# Patient Record
Sex: FEMALE | Race: WHITE | NOT HISPANIC OR LATINO | Employment: FULL TIME | URBAN - NONMETROPOLITAN AREA
[De-identification: names, ages, dates, MRNs, and addresses within clinical notes are randomized per-mention and may not be internally consistent; named-entity substitution may affect disease eponyms.]

---

## 2017-01-09 RX ORDER — FLUCONAZOLE 150 MG/1
150 TABLET ORAL ONCE
Qty: 2 TABLET | Refills: 2 | Status: SHIPPED | OUTPATIENT
Start: 2017-01-09 | End: 2017-01-09

## 2017-01-30 DIAGNOSIS — IMO0002 ADULT BODY MASS INDEX GREATER THAN 30: ICD-10-CM

## 2017-01-30 DIAGNOSIS — Z78.9 CURRENTLY ATTEMPTING TO QUIT USING TOBACCO: ICD-10-CM

## 2017-01-30 RX ORDER — TOPIRAMATE 100 MG/1
1 CAPSULE, EXTENDED RELEASE ORAL
Qty: 30 CAPSULE | Refills: 3 | Status: SHIPPED | OUTPATIENT
Start: 2017-01-30 | End: 2017-03-06 | Stop reason: SDUPTHER

## 2017-01-30 RX ORDER — BUPROPION HYDROCHLORIDE 300 MG/1
300 TABLET ORAL EVERY MORNING
Qty: 30 TABLET | Refills: 3 | Status: SHIPPED | OUTPATIENT
Start: 2017-01-30 | End: 2017-06-05 | Stop reason: SDUPTHER

## 2017-01-30 RX ORDER — PHENTERMINE HYDROCHLORIDE 15 MG/1
15 CAPSULE ORAL EVERY MORNING
Qty: 30 CAPSULE | Refills: 0 | Status: SHIPPED | OUTPATIENT
Start: 2017-01-30 | End: 2017-03-06 | Stop reason: SDUPTHER

## 2017-02-23 ENCOUNTER — OFFICE VISIT (OUTPATIENT)
Dept: INTERNAL MEDICINE | Facility: CLINIC | Age: 37
End: 2017-02-23

## 2017-02-23 ENCOUNTER — TELEPHONE (OUTPATIENT)
Dept: INTERNAL MEDICINE | Facility: CLINIC | Age: 37
End: 2017-02-23

## 2017-02-23 VITALS
SYSTOLIC BLOOD PRESSURE: 126 MMHG | BODY MASS INDEX: 35.16 KG/M2 | HEIGHT: 67 IN | HEART RATE: 82 BPM | OXYGEN SATURATION: 99 % | DIASTOLIC BLOOD PRESSURE: 84 MMHG | TEMPERATURE: 99.2 F | WEIGHT: 224 LBS

## 2017-02-23 DIAGNOSIS — J10.1 INFLUENZA A: Primary | ICD-10-CM

## 2017-02-23 LAB
EXPIRATION DATE: ABNORMAL
FLUAV AG NPH QL: POSITIVE
FLUBV AG NPH QL: NEGATIVE
INTERNAL CONTROL: ABNORMAL
Lab: ABNORMAL

## 2017-02-23 PROCEDURE — 87804 INFLUENZA ASSAY W/OPTIC: CPT | Performed by: PHYSICIAN ASSISTANT

## 2017-02-23 PROCEDURE — 99213 OFFICE O/P EST LOW 20 MIN: CPT | Performed by: PHYSICIAN ASSISTANT

## 2017-02-23 RX ORDER — DEXTROMETHORPHAN HYDROBROMIDE AND PROMETHAZINE HYDROCHLORIDE 15; 6.25 MG/5ML; MG/5ML
SYRUP ORAL
Qty: 180 ML | Refills: 0 | Status: SHIPPED | OUTPATIENT
Start: 2017-02-23 | End: 2017-03-31

## 2017-02-23 RX ORDER — OSELTAMIVIR PHOSPHATE 75 MG/1
75 CAPSULE ORAL 2 TIMES DAILY
Qty: 10 CAPSULE | Refills: 0 | Status: SHIPPED | OUTPATIENT
Start: 2017-02-23 | End: 2017-02-28

## 2017-02-23 NOTE — TELEPHONE ENCOUNTER
----- Message from Cierra Bowens sent at 2/23/2017  4:10 PM EST -----  Contact: Patient   Patient called requesting to see if there is any way that she could be squeezed in this evening. She said that she has spiked a fever of 102.0 and she is wanting to avoid the urgent care.

## 2017-02-23 NOTE — PROGRESS NOTES
America Bonilla is a 36 y.o. female.     Subjective   History of Present Illness   Here today with concern of sudden onset fever this afternoon. Had a headache prior to fever onset. No cough or chills.       The following portions of the patient's history were reviewed and updated as appropriate: allergies, current medications, past family history, past medical history, past social history, past surgical history and problem list.    Review of Systems    Constitutional: fever. Negative for appetite change, chills, fatigue and unexpected weight change.   HENT: Negative for congestion, ear pain, hearing loss, nosebleeds, postnasal drip, rhinorrhea, sore throat, tinnitus and trouble swallowing.    Eyes: Negative for photophobia, discharge and visual disturbance.   Respiratory: Negative for cough, chest tightness, shortness of breath and wheezing.    Cardiovascular: Negative for chest pain, palpitations and leg swelling.   Gastrointestinal: Negative for abdominal distention, abdominal pain, blood in stool, constipation, diarrhea, nausea and vomiting.   Endocrine: Negative for cold intolerance, heat intolerance, polydipsia, polyphagia and polyuria.   Musculoskeletal: body aches. Negative for back pain, joint swelling, myalgias, neck pain and neck stiffness.   Skin: Negative for color change, pallor, rash and wound.   Allergic/Immunologic: Negative for environmental allergies, food allergies and immunocompromised state.   Neurological: Negative for dizziness, tremors, seizures, weakness, numbness and headaches.   Hematological: Negative for adenopathy. Does not bruise/bleed easily.   Psychiatric/Behavioral: Negative for agitation, behavioral problems, confusion, hallucinations, self-injury and suicidal ideas. The patient is not nervous/anxious.      Objective    Physical Exam  Constitutional: Oriented to person, place, and time. Appears well-developed and well-nourished.   HENT:   Head: Normocephalic and atraumatic.  "  Eyes: EOM are normal. Pupils are equal, round, and reactive to light.   Neck: Normal range of motion. Neck supple.   Cardiovascular: Normal rate, regular rhythm and normal heart sounds.    Pulmonary/Chest: Effort normal and breath sounds normal. No respiratory distress.  Has no wheezes or rales. Exhibits no chest wall tenderness.   Abdominal: Soft. Bowel sounds are normal. Exhibits no distension and no mass. There is no tenderness.   Musculoskeletal: Normal range of motion. Exhibits no tenderness.   Neurological: Alert and oriented to person, place, and time.   Skin: Skin is warm and dry.   Psychiatric: Has a normal mood and affect. Behavior is normal. Judgment and thought content normal.       Visit Vitals   • /84   • Pulse 82   • Temp 99.2 °F (37.3 °C)   • Ht 67\" (170.2 cm)   • Wt 224 lb (102 kg)   • SpO2 99%   • BMI 35.08 kg/m2       Nursing note and vitals reviewed.        Assessment/Plan   America was seen today for fever.    Diagnoses and all orders for this visit:    Influenza A  -     oseltamivir (TAMIFLU) 75 MG capsule; Take 1 capsule by mouth 2 (Two) Times a Day for 5 days.  -     promethazine-dextromethorphan (PROMETHAZINE-DM) 6.25-15 MG/5ML syrup; Take 5-10 mL by mouth at bedtime as needed for cough.               "

## 2017-03-06 DIAGNOSIS — IMO0002 ADULT BODY MASS INDEX GREATER THAN 30: ICD-10-CM

## 2017-03-06 NOTE — TELEPHONE ENCOUNTER
I have already sent Phentermine rx to Dr. Cisneros to refill. I cannot send the Trokendi b/c she has one rx that is pending. It has been sent to Dr. Cisneros as well.

## 2017-03-06 NOTE — TELEPHONE ENCOUNTER
----- Message from Perri Cabello sent at 3/6/2017 12:06 PM EST -----  Contact: PATIENT  Patient called stating that she needed to refill her meds, sent the request for one, but the other was not listed.   States that she also needs TROKENDI XR 50 MG capsule sustained-release 24 hr.    Thank you.

## 2017-03-07 RX ORDER — PHENTERMINE HYDROCHLORIDE 15 MG/1
15 CAPSULE ORAL EVERY MORNING
Qty: 30 CAPSULE | Refills: 0 | Status: SHIPPED | OUTPATIENT
Start: 2017-03-07 | End: 2017-03-31

## 2017-03-07 RX ORDER — TOPIRAMATE 100 MG/1
1 CAPSULE, EXTENDED RELEASE ORAL
Qty: 30 CAPSULE | Refills: 3 | Status: SHIPPED | OUTPATIENT
Start: 2017-03-07 | End: 2017-03-31

## 2017-03-31 ENCOUNTER — OFFICE VISIT (OUTPATIENT)
Dept: INTERNAL MEDICINE | Facility: CLINIC | Age: 37
End: 2017-03-31

## 2017-03-31 VITALS
SYSTOLIC BLOOD PRESSURE: 124 MMHG | TEMPERATURE: 98.4 F | RESPIRATION RATE: 12 BRPM | HEIGHT: 67 IN | OXYGEN SATURATION: 97 % | HEART RATE: 68 BPM | WEIGHT: 223 LBS | BODY MASS INDEX: 35 KG/M2 | DIASTOLIC BLOOD PRESSURE: 82 MMHG

## 2017-03-31 DIAGNOSIS — F50.81 BINGE-EATING DISORDER, MODERATE: ICD-10-CM

## 2017-03-31 DIAGNOSIS — F41.1 GENERALIZED ANXIETY DISORDER: Primary | ICD-10-CM

## 2017-03-31 PROCEDURE — 99214 OFFICE O/P EST MOD 30 MIN: CPT | Performed by: FAMILY MEDICINE

## 2017-03-31 RX ORDER — HYDROXYZINE HYDROCHLORIDE 25 MG/1
25 TABLET, FILM COATED ORAL 3 TIMES DAILY PRN
Qty: 90 TABLET | Refills: 1 | Status: SHIPPED | OUTPATIENT
Start: 2017-03-31 | End: 2018-03-09

## 2017-04-04 DIAGNOSIS — F50.81 BINGE-EATING DISORDER, MODERATE: ICD-10-CM

## 2017-05-04 DIAGNOSIS — F50.81 BINGE-EATING DISORDER, MODERATE: ICD-10-CM

## 2017-05-04 RX ORDER — AMOXICILLIN 875 MG/1
875 TABLET, COATED ORAL 2 TIMES DAILY
Qty: 20 TABLET | Refills: 0 | Status: SHIPPED | OUTPATIENT
Start: 2017-05-04 | End: 2017-12-05

## 2017-06-01 RX ORDER — FLUCONAZOLE 150 MG/1
150 TABLET ORAL ONCE
Qty: 1 TABLET | Refills: 1 | Status: SHIPPED | OUTPATIENT
Start: 2017-06-01 | End: 2017-06-01

## 2017-06-05 ENCOUNTER — PATIENT MESSAGE (OUTPATIENT)
Dept: INTERNAL MEDICINE | Facility: CLINIC | Age: 37
End: 2017-06-05

## 2017-06-05 DIAGNOSIS — Z78.9 CURRENTLY ATTEMPTING TO QUIT USING TOBACCO: ICD-10-CM

## 2017-06-05 DIAGNOSIS — F50.81 BINGE-EATING DISORDER, MODERATE: ICD-10-CM

## 2017-06-05 RX ORDER — BUPROPION HYDROCHLORIDE 300 MG/1
300 TABLET ORAL EVERY MORNING
Qty: 30 TABLET | Refills: 3 | Status: SHIPPED | OUTPATIENT
Start: 2017-06-05 | End: 2017-10-09 | Stop reason: SDUPTHER

## 2017-06-05 NOTE — TELEPHONE ENCOUNTER
From: America Bonilla  To: Patty Cisneros MD  Sent: 6/5/2017 5:14 AM EDT  Subject: Prescription Question    I need refills of vyvanse and Wellbutrin please

## 2017-06-28 RX ORDER — FLUCONAZOLE 150 MG/1
150 TABLET ORAL ONCE
Qty: 1 TABLET | Refills: 1 | Status: SHIPPED | OUTPATIENT
Start: 2017-06-28 | End: 2017-06-28

## 2017-07-07 DIAGNOSIS — F50.81 BINGE-EATING DISORDER, MODERATE: ICD-10-CM

## 2017-07-21 ENCOUNTER — OFFICE VISIT (OUTPATIENT)
Dept: INTERNAL MEDICINE | Facility: CLINIC | Age: 37
End: 2017-07-21

## 2017-07-21 VITALS
TEMPERATURE: 98.4 F | OXYGEN SATURATION: 97 % | HEART RATE: 70 BPM | WEIGHT: 228.8 LBS | HEIGHT: 67 IN | DIASTOLIC BLOOD PRESSURE: 60 MMHG | BODY MASS INDEX: 35.91 KG/M2 | SYSTOLIC BLOOD PRESSURE: 104 MMHG

## 2017-07-21 DIAGNOSIS — F50.81 BINGE-EATING DISORDER, MODERATE: Primary | ICD-10-CM

## 2017-07-21 DIAGNOSIS — F41.1 GENERALIZED ANXIETY DISORDER: ICD-10-CM

## 2017-07-21 PROCEDURE — 99213 OFFICE O/P EST LOW 20 MIN: CPT | Performed by: FAMILY MEDICINE

## 2017-07-21 NOTE — PATIENT INSTRUCTIONS
macronutrients   trying to get 40-50 calories from proteins,   30-40% calories from unsaturated fats,   25-30% calories from carbohydrates      intermittent fasting (limiting caloric intake to 400-500 hermelindo a day twice a week).    2 different protocols:     30 minutes:    Run as fast as you can for 10 seconds   Walk as fast as you can for 20 seconds   Walk as slow as you can for 30 seconds    30 minutes on stationary bike (muscle energy booster)   Ride as hard and as fast as you can for 1 minute   Rest for 4 minutes

## 2017-07-25 NOTE — PROGRESS NOTES
"SUBJECTIVE: America Bonilla is a 37 y.o. female seen for a follow up visit;    BED: doing better from binging standpoint. But not making good food choices when she does eat.  Also not exercising at all.  She isn't having any side effects from the vyvanse however.       ANJUM: Doing ok, not having any panic attacks.  Just moved and really likes the new place.  She was able to handle the move w/o any big mood swings or issues.   The following portions of the patient's history were reviewed and updated as appropriate: current medications and problem list.    Review of Systems   Constitutional: Negative.    HENT: Negative.    Eyes: Negative.    Respiratory: Negative.    Cardiovascular: Negative.    Gastrointestinal: Negative.    Endocrine: Negative.    Genitourinary: Negative.    Musculoskeletal: Negative.    Skin: Negative.    Allergic/Immunologic: Negative.    Neurological: Negative.    Hematological: Negative.    Psychiatric/Behavioral: Negative.  Negative for dysphoric mood and sleep disturbance. The patient is not nervous/anxious.          OBJECTIVE:  /60  Pulse 70  Temp 98.4 °F (36.9 °C)  Ht 67\" (170.2 cm)  Wt 228 lb 12.8 oz (104 kg)  SpO2 97%  BMI 35.84 kg/m2     Physical Exam   Constitutional: She appears well-developed and well-nourished. No distress.   Psychiatric: She has a normal mood and affect. Her speech is normal and behavior is normal. Thought content normal.           ASSESSMENT:  1. Binge-eating disorder, moderate  stable  - lisdexamfetamine (VYVANSE) 50 MG capsule; Take 1 capsule by mouth Every Morning.  Dispense: 30 capsule; Refill: 0    2. Generalized anxiety disorder  Stable, continue wellbutrin.     America Bonilla counseled about obesity and need for weight loss.  Discussed the principles of nutrition labels (yes), decreased sugary drinks like soda, sweet tea, gatorade etc. (yes), decreasing simple carbohydrates and trading for more fiber containing carbs (yes).  Discussed trading " high fat meats for lean meats like pork, poultry, fish (yes).  Recommended adding non-saturated fats and proteins to earlier day meals to help manage hunger (yes).  Also discussed need for increased physical activity (yes).      Discussed macronutrients and recommended trying to get 40-50 calories from proteins, 30-40% calories from unsaturated fats, and 25-30% calories from carbohydrates (yes).      Total time spent counseling > 50% visit time of total visit time 20 minutes. Time spent counseling: 15 minutes.

## 2017-09-05 DIAGNOSIS — F50.81 BINGE-EATING DISORDER, MODERATE: ICD-10-CM

## 2017-09-05 RX ORDER — FLUCONAZOLE 150 MG/1
150 TABLET ORAL ONCE
Qty: 1 TABLET | Refills: 0 | Status: SHIPPED | OUTPATIENT
Start: 2017-09-05 | End: 2017-09-05

## 2017-10-09 DIAGNOSIS — F50.81 BINGE-EATING DISORDER, MODERATE: ICD-10-CM

## 2017-10-09 DIAGNOSIS — Z78.9 CURRENTLY ATTEMPTING TO QUIT USING TOBACCO: ICD-10-CM

## 2017-10-09 RX ORDER — BUPROPION HYDROCHLORIDE 300 MG/1
300 TABLET ORAL EVERY MORNING
Qty: 30 TABLET | Refills: 3 | Status: SHIPPED | OUTPATIENT
Start: 2017-10-09 | End: 2017-12-05 | Stop reason: SDUPTHER

## 2017-12-05 ENCOUNTER — OFFICE VISIT (OUTPATIENT)
Dept: INTERNAL MEDICINE | Facility: CLINIC | Age: 37
End: 2017-12-05

## 2017-12-05 VITALS
SYSTOLIC BLOOD PRESSURE: 110 MMHG | HEIGHT: 67 IN | WEIGHT: 231.6 LBS | OXYGEN SATURATION: 98 % | RESPIRATION RATE: 12 BRPM | BODY MASS INDEX: 36.35 KG/M2 | DIASTOLIC BLOOD PRESSURE: 70 MMHG | HEART RATE: 66 BPM

## 2017-12-05 DIAGNOSIS — E53.8 VITAMIN B12 DEFICIENCY: ICD-10-CM

## 2017-12-05 DIAGNOSIS — Z00.00 HEALTHCARE MAINTENANCE: ICD-10-CM

## 2017-12-05 DIAGNOSIS — E55.9 VITAMIN D DEFICIENCY: ICD-10-CM

## 2017-12-05 DIAGNOSIS — F41.1 GENERALIZED ANXIETY DISORDER: Primary | ICD-10-CM

## 2017-12-05 PROCEDURE — 99213 OFFICE O/P EST LOW 20 MIN: CPT | Performed by: FAMILY MEDICINE

## 2017-12-05 RX ORDER — BUPROPION HYDROCHLORIDE 300 MG/1
300 TABLET ORAL EVERY MORNING
Qty: 90 TABLET | Refills: 1 | Status: SHIPPED | OUTPATIENT
Start: 2017-12-05 | End: 2018-06-04 | Stop reason: SDUPTHER

## 2017-12-05 NOTE — PROGRESS NOTES
"3 month follow up visit. Pt d/c'd Vyvanse. Wasn't really helping, so she did not ask for refill when she ran out.   Had pap last month with GYN. Was WNL. Is scheduled for first mammogram in January.     SUBJECTIVE: America Bonilla is a 37 y.o. female seen for a follow up visit;          BED: doing well, not binging.  Stopped the vyvanse and hasn't noticed a difference.  No weight gain since stopping.     Obesity: doing better as far as diet - trying hello fresh a few times a week + lunch.  She is trying to keep healthier things in the house and not eating out as much.      The following portions of the patient's history were reviewed and updated as appropriate: She  has a past medical history of Anemia; Annular tear of lumbar disc; Lumbar herniated disc; Ovarian cyst; Seizures; and Vasovagal syncope.  She has Adult body mass index greater than 30 and Tobacco abuse on her pertinent problem list.  She  has a past surgical history that includes Breast surgery and Colposcopy.  Her family history includes Cancer in her other and other; Heart disease in her father; Stroke in her other; Thyroid disease in her mother.  She  reports that she has been smoking Cigarettes.  She has a 8.50 pack-year smoking history. She has never used smokeless tobacco. She reports that she drinks alcohol. She reports that she does not use illicit drugs.  She has a current medication list which includes the following prescription(s): bupropion xl, hydroxyzine, and levonorgestrel..    Review of Systems   Constitutional: Positive for fatigue. Negative for unexpected weight change.   Respiratory: Negative.    Cardiovascular: Negative.    Gastrointestinal: Negative.    Neurological: Negative.          OBJECTIVE:  /70  Pulse 66  Resp 12  Ht 170.2 cm (67\")  Wt 105 kg (231 lb 9.6 oz)  SpO2 98%  BMI 36.27 kg/m2     Physical Exam   Constitutional: She appears well-developed and well-nourished. No distress.           ASSESSMENT:   Diagnosis " Plan   1. Generalized anxiety disorder  buPROPion XL (WELLBUTRIN XL) 300 MG 24 hr tablet    TSH    T4   2. Vitamin B12 deficiency  CBC (No Diff)    Ferritin    Iron Profile    Vitamin B12    Glia(IgA / G) & TTG(IgA / G)   3. Vitamin D deficiency  Vitamin D 25 Hydroxy   4. Healthcare maintenance  Comprehensive Metabolic Panel    Vitamin D 25 Hydroxy    TSH    T4    CBC (No Diff)    Ferritin    Iron Profile    Vitamin B12       Medications Discontinued During This Encounter   Medication Reason   • lisdexamfetamine (VYVANSE) 50 MG capsule    • buPROPion XL (WELLBUTRIN XL) 300 MG 24 hr tablet Reorder   • amoxicillin (AMOXIL) 875 MG tablet            Return in about 3 months (around 3/5/2018).

## 2017-12-06 LAB
25(OH)D3+25(OH)D2 SERPL-MCNC: 37.8 NG/ML
ALBUMIN SERPL-MCNC: 4.4 G/DL (ref 3.5–5)
ALBUMIN/GLOB SERPL: 1.6 G/DL (ref 1–2)
ALP SERPL-CCNC: 62 U/L (ref 38–126)
ALT SERPL-CCNC: 30 U/L (ref 13–69)
AST SERPL-CCNC: 26 U/L (ref 15–46)
BILIRUB SERPL-MCNC: 0.4 MG/DL (ref 0.2–1.3)
BUN SERPL-MCNC: 15 MG/DL (ref 7–20)
BUN/CREAT SERPL: 21.4 (ref 7.1–23.5)
CALCIUM SERPL-MCNC: 9.7 MG/DL (ref 8.4–10.2)
CHLORIDE SERPL-SCNC: 103 MMOL/L (ref 98–107)
CO2 SERPL-SCNC: 25 MMOL/L (ref 26–30)
CREAT SERPL-MCNC: 0.7 MG/DL (ref 0.6–1.3)
ERYTHROCYTE [DISTWIDTH] IN BLOOD BY AUTOMATED COUNT: 11.8 % (ref 11.5–14.5)
FERRITIN SERPL-MCNC: 37.9 NG/ML (ref 6.24–137)
GFR SERPLBLD CREATININE-BSD FMLA CKD-EPI: 115 ML/MIN/1.73
GFR SERPLBLD CREATININE-BSD FMLA CKD-EPI: 95 ML/MIN/1.73
GLIADIN PEPTIDE IGA SER-ACNC: 2 UNITS (ref 0–19)
GLIADIN PEPTIDE IGG SER-ACNC: 4 UNITS (ref 0–19)
GLOBULIN SER CALC-MCNC: 2.8 GM/DL
GLUCOSE SERPL-MCNC: 86 MG/DL (ref 74–98)
HCT VFR BLD AUTO: 41.5 % (ref 37–47)
HGB BLD-MCNC: 13.9 G/DL (ref 12–16)
IRON SATN MFR SERPL: 35 % (ref 11–46)
IRON SERPL-MCNC: 104 MCG/DL (ref 37–181)
MCH RBC QN AUTO: 31.1 PG (ref 27–31)
MCHC RBC AUTO-ENTMCNC: 33.5 G/DL (ref 30–37)
MCV RBC AUTO: 92.8 FL (ref 81–99)
PLATELET # BLD AUTO: 189 10*3/MM3 (ref 130–400)
POTASSIUM SERPL-SCNC: 3.8 MMOL/L (ref 3.5–5.1)
PROT SERPL-MCNC: 7.2 G/DL (ref 6.3–8.2)
RBC # BLD AUTO: 4.47 10*6/MM3 (ref 4.2–5.4)
SODIUM SERPL-SCNC: 140 MMOL/L (ref 137–145)
T4 SERPL-MCNC: 9 UG/DL (ref 4.5–12)
TIBC SERPL-MCNC: 298 MCG/DL (ref 261–497)
TSH SERPL DL<=0.005 MIU/L-ACNC: 1.88 MIU/ML (ref 0.47–4.68)
TTG IGA SER-ACNC: <2 U/ML (ref 0–3)
TTG IGG SER-ACNC: <2 U/ML (ref 0–5)
UIBC SERPL-MCNC: 194 MCG/DL
VIT B12 SERPL-MCNC: 788 PG/ML (ref 239–931)
WBC # BLD AUTO: 8.84 10*3/MM3 (ref 4.8–10.8)

## 2017-12-26 ENCOUNTER — DOCUMENTATION (OUTPATIENT)
Dept: INTERNAL MEDICINE | Facility: CLINIC | Age: 37
End: 2017-12-26

## 2017-12-26 RX ORDER — OSELTAMIVIR PHOSPHATE 75 MG/1
75 CAPSULE ORAL 2 TIMES DAILY
Qty: 10 CAPSULE | Refills: 0 | Status: SHIPPED | OUTPATIENT
Start: 2017-12-26 | End: 2017-12-31

## 2018-01-31 ENCOUNTER — OFFICE VISIT (OUTPATIENT)
Dept: INTERNAL MEDICINE | Facility: CLINIC | Age: 38
End: 2018-01-31

## 2018-01-31 VITALS
OXYGEN SATURATION: 95 % | WEIGHT: 239 LBS | BODY MASS INDEX: 37.51 KG/M2 | TEMPERATURE: 98.6 F | HEART RATE: 91 BPM | DIASTOLIC BLOOD PRESSURE: 88 MMHG | HEIGHT: 67 IN | SYSTOLIC BLOOD PRESSURE: 128 MMHG

## 2018-01-31 DIAGNOSIS — S69.91XA FINGER INJURY, RIGHT, INITIAL ENCOUNTER: Primary | ICD-10-CM

## 2018-01-31 PROCEDURE — 90715 TDAP VACCINE 7 YRS/> IM: CPT | Performed by: PHYSICIAN ASSISTANT

## 2018-01-31 PROCEDURE — 99213 OFFICE O/P EST LOW 20 MIN: CPT | Performed by: PHYSICIAN ASSISTANT

## 2018-01-31 PROCEDURE — 90471 IMMUNIZATION ADMIN: CPT | Performed by: PHYSICIAN ASSISTANT

## 2018-01-31 NOTE — PROGRESS NOTES
"Subjective     Chief Complaint: finger injury    History of Present Illness     America Bonilla is a 37 y.o. female presenting with complaints of injury to right middle finger 4 days ago.  States her window is broken at home, typically she uses a wooden spoon or something to keep it open.  The window came down and caught a piece of tissue on her right middle finger.  She and her  applied pressure until bleeding stopped, since then they have been irrigating the wound with water and keeping it bandaged with Neosporin.  Her pain and the wound are improving, no signs of infection.  Denies fever, chills.  Unsure of her last Tdap, believes it has been more than 10 years.    The following portions of the patient's history were reviewed and updated as appropriate: current medications, allergies, PMH.    Review of Systems   Constitutional: Negative for chills and fever.   Skin: Positive for wound.       Objective     Vitals:    01/31/18 1346   BP: 128/88   Pulse: 91   Temp: 98.6 °F (37 °C)   SpO2: 95%   Weight: 108 kg (239 lb)   Height: 170.2 cm (67.01\")       Physical Exam   Skin: Abrasion noted. No erythema.   Round wound to dorsal side of right middle finger directly beneath the fingernail.  No signs of erythema or warmth.  Finger is not swollen.       Assessment/Plan     Diagnoses and all orders for this visit:    Injury  -     Tdap Vaccine Greater Than or Equal To 8yo IM      Update Tdap Today.  Continue to keep wound clean, Neosporin or vaseline is fine, watch for any signs of infection- patient verbalized understanding.    RTC if symptoms worsen or fail to improve.    Yomaira Cm PA-C  01/31/2018         Please note that portions of this note were completed with a voice recognition program. Efforts were made to edit dictation, but occasionally words are mistranscribed.  "

## 2018-02-26 ENCOUNTER — OFFICE VISIT (OUTPATIENT)
Dept: INTERNAL MEDICINE | Facility: CLINIC | Age: 38
End: 2018-02-26

## 2018-02-26 VITALS
HEIGHT: 67 IN | BODY MASS INDEX: 38.8 KG/M2 | SYSTOLIC BLOOD PRESSURE: 110 MMHG | HEART RATE: 71 BPM | TEMPERATURE: 97.8 F | WEIGHT: 247.2 LBS | DIASTOLIC BLOOD PRESSURE: 70 MMHG | RESPIRATION RATE: 12 BRPM | OXYGEN SATURATION: 97 %

## 2018-02-26 DIAGNOSIS — E66.09 CLASS 2 OBESITY DUE TO EXCESS CALORIES WITHOUT SERIOUS COMORBIDITY WITH BODY MASS INDEX (BMI) OF 38.0 TO 38.9 IN ADULT: Primary | ICD-10-CM

## 2018-02-26 PROCEDURE — 99213 OFFICE O/P EST LOW 20 MIN: CPT | Performed by: FAMILY MEDICINE

## 2018-02-26 RX ORDER — PHENTERMINE HYDROCHLORIDE 15 MG/1
15 CAPSULE ORAL EVERY MORNING
Qty: 14 CAPSULE | Refills: 0 | Status: SHIPPED | OUTPATIENT
Start: 2018-02-26 | End: 2018-03-09 | Stop reason: SDUPTHER

## 2018-03-09 ENCOUNTER — OFFICE VISIT (OUTPATIENT)
Dept: INTERNAL MEDICINE | Facility: CLINIC | Age: 38
End: 2018-03-09

## 2018-03-09 VITALS
BODY MASS INDEX: 38.04 KG/M2 | OXYGEN SATURATION: 98 % | WEIGHT: 242.4 LBS | HEART RATE: 74 BPM | HEIGHT: 67 IN | RESPIRATION RATE: 12 BRPM | DIASTOLIC BLOOD PRESSURE: 80 MMHG | SYSTOLIC BLOOD PRESSURE: 122 MMHG

## 2018-03-09 DIAGNOSIS — Z00.00 HEALTHCARE MAINTENANCE: ICD-10-CM

## 2018-03-09 DIAGNOSIS — F41.1 GENERALIZED ANXIETY DISORDER: Primary | ICD-10-CM

## 2018-03-09 PROCEDURE — 99213 OFFICE O/P EST LOW 20 MIN: CPT | Performed by: FAMILY MEDICINE

## 2018-03-09 RX ORDER — PHENTERMINE HYDROCHLORIDE 15 MG/1
15 CAPSULE ORAL 2 TIMES DAILY PRN
Qty: 60 CAPSULE | Refills: 0 | Status: SHIPPED | OUTPATIENT
Start: 2018-03-09 | End: 2018-04-05 | Stop reason: SDUPTHER

## 2018-03-09 NOTE — PROGRESS NOTES
3 month follow up, has started Phentermine that was rx'd at last visit.   [facility] mamm [facility]    SUBJECTIVE: America Bonilla is a 37 y.o. female seen for a follow up visit;          ANJUM: doing ok w/ wellbutrin 300 mg.  Has had several life stresses but has gotten better about coping and is able to manage her response to stresses.  She denies any side effects from the wellbutrin and doesn't think she needs to go up on it.  She hasn't tried to quit smoking yet and doesn't feel like she is ready for it.     Obesity: tolerating 15 m phentermine and it's helping with appetite.  Taking it twice a day some days if she needs it.  No side effects of increased blood pressure or sleep issues.     The following portions of the patient's history were reviewed and updated as appropriate: She  has a past medical history of Anemia; Annular tear of lumbar disc; Lumbar herniated disc; Ovarian cyst; Seizures; and Vasovagal syncope.  She has Adult body mass index greater than 30; Tobacco abuse; and Generalized anxiety disorder on her pertinent problem list.  She  has a past surgical history that includes Breast surgery and Colposcopy.  Her family history includes Alcohol abuse in her paternal grandmother; Breast cancer in her maternal grandmother; Cancer in her maternal grandmother; Early death in her paternal grandfather; Heart disease in her father; Hyperlipidemia in her father; Hypertension in her father; Liver disease in her paternal grandmother; No Known Problems in her brother and maternal grandfather; Thyroid disease in her mother.  She  reports that she has been smoking Cigarettes.  She started smoking about 22 years ago. She has a 8.50 pack-year smoking history. She has never used smokeless tobacco. She reports that she drinks alcohol. She reports that she does not use drugs.  She has a current medication list which includes the following prescription(s): bupropion xl, levonorgestrel, amoxicillin, and  "phentermine..    Review of Systems   Constitutional: Negative.    HENT: Negative.    Respiratory: Negative.    Cardiovascular: Negative.    Psychiatric/Behavioral: The patient is nervous/anxious.          OBJECTIVE:  /80   Pulse 74   Resp 12   Ht 170.2 cm (67.01\")   Wt 110 kg (242 lb 6.4 oz)   SpO2 98%   BMI 37.95 kg/m²      Physical Exam   Constitutional: She appears well-developed and well-nourished. No distress.           ASSESSMENT:   Diagnosis Plan   1. Generalized anxiety disorder     2. Healthcare maintenance       ANJUM: continue wellbutrin.       Medications Discontinued During This Encounter   Medication Reason   • phentermine 15 MG capsule Reorder   • hydrOXYzine (ATARAX) 25 MG tablet            Return in about 3 months (around 6/9/2018).                "

## 2018-03-21 RX ORDER — FLUCONAZOLE 150 MG/1
150 TABLET ORAL
Qty: 2 TABLET | Refills: 0 | Status: SHIPPED | OUTPATIENT
Start: 2018-03-21 | End: 2018-03-24

## 2018-03-21 RX ORDER — AMOXICILLIN 875 MG/1
875 TABLET, COATED ORAL 2 TIMES DAILY
Qty: 20 TABLET | Refills: 0 | Status: SHIPPED | OUTPATIENT
Start: 2018-03-21 | End: 2018-06-12

## 2018-04-05 DIAGNOSIS — E66.09 CLASS 2 OBESITY DUE TO EXCESS CALORIES WITHOUT SERIOUS COMORBIDITY WITH BODY MASS INDEX (BMI) OF 38.0 TO 38.9 IN ADULT: ICD-10-CM

## 2018-04-05 RX ORDER — PHENTERMINE HYDROCHLORIDE 15 MG/1
15 CAPSULE ORAL 2 TIMES DAILY PRN
Qty: 60 CAPSULE | Refills: 0 | Status: SHIPPED | OUTPATIENT
Start: 2018-04-05 | End: 2018-05-04 | Stop reason: SDUPTHER

## 2018-04-05 NOTE — TELEPHONE ENCOUNTER
----- Message from America Bonilla sent at 4/5/2018 10:26 AM EDT -----  Regarding: Prescription Question  Contact: [phone]  I need a refill on my Phentermene please.

## 2018-05-04 ENCOUNTER — PATIENT MESSAGE (OUTPATIENT)
Dept: INTERNAL MEDICINE | Facility: CLINIC | Age: 38
End: 2018-05-04

## 2018-05-04 DIAGNOSIS — E66.09 CLASS 2 OBESITY DUE TO EXCESS CALORIES WITHOUT SERIOUS COMORBIDITY WITH BODY MASS INDEX (BMI) OF 38.0 TO 38.9 IN ADULT: ICD-10-CM

## 2018-05-04 RX ORDER — PHENTERMINE HYDROCHLORIDE 15 MG/1
15 CAPSULE ORAL 2 TIMES DAILY PRN
Qty: 60 CAPSULE | Refills: 0 | Status: SHIPPED | OUTPATIENT
Start: 2018-05-04 | End: 2018-06-04 | Stop reason: SDUPTHER

## 2018-05-04 NOTE — TELEPHONE ENCOUNTER
----- Message from America Bonilla sent at 5/4/2018 10:54 AM EDT -----  Regarding: Prescription Question  Contact: [phone]  Can I get a refill on the phentermine?

## 2018-05-29 RX ORDER — FLUCONAZOLE 150 MG/1
150 TABLET ORAL
Qty: 3 TABLET | Refills: 1 | Status: SHIPPED | OUTPATIENT
Start: 2018-05-29 | End: 2018-06-12

## 2018-05-29 RX ORDER — FLUCONAZOLE 150 MG/1
150 TABLET ORAL
Qty: 3 TABLET | Refills: 1 | Status: SHIPPED | OUTPATIENT
Start: 2018-05-29 | End: 2018-05-29 | Stop reason: SDUPTHER

## 2018-06-04 ENCOUNTER — PATIENT MESSAGE (OUTPATIENT)
Dept: INTERNAL MEDICINE | Facility: CLINIC | Age: 38
End: 2018-06-04

## 2018-06-04 DIAGNOSIS — F41.1 GENERALIZED ANXIETY DISORDER: ICD-10-CM

## 2018-06-04 DIAGNOSIS — E66.09 CLASS 2 OBESITY DUE TO EXCESS CALORIES WITHOUT SERIOUS COMORBIDITY WITH BODY MASS INDEX (BMI) OF 38.0 TO 38.9 IN ADULT: ICD-10-CM

## 2018-06-04 RX ORDER — PHENTERMINE HYDROCHLORIDE 15 MG/1
15 CAPSULE ORAL 2 TIMES DAILY PRN
Qty: 60 CAPSULE | Refills: 0 | Status: SHIPPED | OUTPATIENT
Start: 2018-06-04 | End: 2018-07-05 | Stop reason: SDUPTHER

## 2018-06-04 RX ORDER — BUPROPION HYDROCHLORIDE 300 MG/1
300 TABLET ORAL EVERY MORNING
Qty: 90 TABLET | Refills: 1 | Status: SHIPPED | OUTPATIENT
Start: 2018-06-04 | End: 2019-03-18 | Stop reason: SDUPTHER

## 2018-06-04 RX ORDER — BUPROPION HYDROCHLORIDE 300 MG/1
300 TABLET ORAL EVERY MORNING
Qty: 90 TABLET | Refills: 0 | Status: SHIPPED | OUTPATIENT
Start: 2018-06-04 | End: 2018-06-04 | Stop reason: SDUPTHER

## 2018-06-04 NOTE — TELEPHONE ENCOUNTER
Esther Mercedes MA 6/4/2018 9:14 AM EDT    I refilled wellbutrin.   ----- Message -----  From: America Bonilla  Sent: 6/4/2018 5:10 AM  To: Carol Oakes  [facility] [place]  Subject: Prescription Question     I need refills on both Wellbutrin and phentermine. I have a follow-up 6/12, but will run out within the next couple of days. Thanks!

## 2018-06-12 ENCOUNTER — OFFICE VISIT (OUTPATIENT)
Dept: INTERNAL MEDICINE | Facility: CLINIC | Age: 38
End: 2018-06-12

## 2018-06-12 VITALS
WEIGHT: 236 LBS | SYSTOLIC BLOOD PRESSURE: 122 MMHG | BODY MASS INDEX: 37.04 KG/M2 | TEMPERATURE: 98.1 F | DIASTOLIC BLOOD PRESSURE: 80 MMHG | HEART RATE: 80 BPM | HEIGHT: 67 IN | OXYGEN SATURATION: 99 %

## 2018-06-12 DIAGNOSIS — R14.0 ABDOMINAL BLOATING: Primary | ICD-10-CM

## 2018-06-12 DIAGNOSIS — F41.1 GENERALIZED ANXIETY DISORDER: ICD-10-CM

## 2018-06-12 PROCEDURE — 99213 OFFICE O/P EST LOW 20 MIN: CPT | Performed by: FAMILY MEDICINE

## 2018-06-12 NOTE — PROGRESS NOTES
SUBJECTIVE: America Bonilla is a 38 y.o. female seen for a follow up visit;      Abdominal bloating: getting abdominal bloating at least once a day, no straining.  No heartburn, no belching.  More flatus, some cramps.  Usually after meal - 30 mins.      Anxiety: having more anxiety -  was terminated recently and they had their health insurance terminated earlier than they expected.  She is doing ok with this, not having panic attacks, just a bit more anxious.      Obesity: doing ok actually, she hasn't been stress eating, taking once a day most of the time, sometimes twice.      The following portions of the patient's history were reviewed and updated as appropriate: She  has a past medical history of Anemia; Annular tear of lumbar disc; Lumbar herniated disc; Ovarian cyst; Seizures; and Vasovagal syncope.  She has Adult body mass index greater than 30; Tobacco abuse; and Generalized anxiety disorder on her pertinent problem list.  She  has a past surgical history that includes Breast surgery and Colposcopy.  Her family history includes Alcohol abuse in her paternal grandmother; Breast cancer in her maternal grandmother; Cancer in her maternal grandmother; Early death in her paternal grandfather; Heart disease in her father; Hyperlipidemia in her father; Hypertension in her father; Liver disease in her paternal grandmother; No Known Problems in her brother and maternal grandfather; Thyroid disease in her mother.  She  reports that she has been smoking Cigarettes.  She started smoking about 22 years ago. She has a 8.50 pack-year smoking history. She has never used smokeless tobacco. She reports that she drinks alcohol. She reports that she does not use drugs.  She has a current medication list which includes the following prescription(s): bupropion xl, levonorgestrel, and phentermine..    Review of Systems   Constitutional: Negative.    Respiratory: Negative.    Cardiovascular: Negative.    Gastrointestinal:  "Positive for abdominal distention. Negative for abdominal pain.   Psychiatric/Behavioral: Negative.          OBJECTIVE:  /80   Pulse 80   Temp 98.1 °F (36.7 °C)   Ht 170.2 cm (67\")   Wt 107 kg (236 lb)   SpO2 99%   BMI 36.96 kg/m²      Physical Exam   Constitutional: She appears well-developed and well-nourished. No distress.           ASSESSMENT:   Diagnosis Plan   1. Abdominal bloating     2. Generalized anxiety disorder         Bloating: Recommended elimination diet - sounds like lactose intolerance versus IBS, if doesn't improve will get imaging to try to figure out if constipation involved.   ANJUM: stable, continue wellbutrin.          I, Patty Cisneros MD, hereby attest that the medical record entry above accurately reflects signatures/notations that I made in my capacity as Patty Cisneros MD when I treated and diagnosed the above patient.  I do hereby attest that his information is true, accurate, and complete to the best of my knowledge and I understand that any falsification, omission, or concealment of material fact may subject me to administrative, civil, or criminal liability.                "

## 2018-07-05 DIAGNOSIS — E66.09 CLASS 2 OBESITY DUE TO EXCESS CALORIES WITHOUT SERIOUS COMORBIDITY WITH BODY MASS INDEX (BMI) OF 38.0 TO 38.9 IN ADULT: ICD-10-CM

## 2018-07-10 RX ORDER — PHENTERMINE HYDROCHLORIDE 15 MG/1
15 CAPSULE ORAL 2 TIMES DAILY PRN
Qty: 60 CAPSULE | Refills: 0 | Status: SHIPPED | OUTPATIENT
Start: 2018-07-10 | End: 2018-08-06 | Stop reason: SDUPTHER

## 2018-07-17 ENCOUNTER — OFFICE VISIT (OUTPATIENT)
Dept: INTERNAL MEDICINE | Facility: CLINIC | Age: 38
End: 2018-07-17

## 2018-07-17 VITALS
HEIGHT: 67 IN | BODY MASS INDEX: 36.41 KG/M2 | OXYGEN SATURATION: 98 % | HEART RATE: 70 BPM | WEIGHT: 232 LBS | TEMPERATURE: 98.3 F | DIASTOLIC BLOOD PRESSURE: 86 MMHG | SYSTOLIC BLOOD PRESSURE: 118 MMHG

## 2018-07-17 DIAGNOSIS — J01.40 ACUTE NON-RECURRENT PANSINUSITIS: Primary | ICD-10-CM

## 2018-07-17 DIAGNOSIS — R06.02 SHORTNESS OF BREATH: ICD-10-CM

## 2018-07-17 PROCEDURE — 99213 OFFICE O/P EST LOW 20 MIN: CPT | Performed by: PHYSICIAN ASSISTANT

## 2018-07-17 RX ORDER — AMOXICILLIN AND CLAVULANATE POTASSIUM 875; 125 MG/1; MG/1
1 TABLET, FILM COATED ORAL 2 TIMES DAILY
Qty: 20 TABLET | Refills: 0 | Status: SHIPPED | OUTPATIENT
Start: 2018-07-17 | End: 2018-07-27

## 2018-07-17 RX ORDER — FLUCONAZOLE 150 MG/1
TABLET ORAL
Qty: 3 TABLET | Refills: 1 | Status: SHIPPED | OUTPATIENT
Start: 2018-07-17 | End: 2018-09-06

## 2018-07-17 RX ORDER — ALBUTEROL SULFATE 90 UG/1
2 AEROSOL, METERED RESPIRATORY (INHALATION) EVERY 4 HOURS PRN
Qty: 1 INHALER | Refills: 11 | Status: SHIPPED | OUTPATIENT
Start: 2018-07-17 | End: 2018-09-06

## 2018-07-17 NOTE — PROGRESS NOTES
America Bonilla is a 38 y.o. female.     Subjective   History of Present Illness   Here today with concern of a few days of low-grade fever, congestion, scratchy throat, sinus pressure and headaches. She has been using Jacquelyn [place] Cold-Sinus, Neti-pot rinses and Afrin 1-2 times but nothing seems to help. She admits to a little cough and SOA at times but denies any chest tightness or wheezing.         The following portions of the patient's history were reviewed and updated as appropriate: allergies, current medications, past family history, past medical history, past social history, past surgical history and problem list.    Review of Systems    Constitutional: fever, chills. Negative for appetite change, fatigue and unexpected weight change.   HENT: postnasal drip, rhinorrhea, sore throat, congestion.  Negative for ear pain, hearing loss, nosebleeds, tinnitus and trouble swallowing.    Eyes: Negative for photophobia, discharge and visual disturbance.   Respiratory: cough, shortness of breath.  Negative for chest tightness and wheezing.    Cardiovascular: Negative for chest pain, palpitations and leg swelling.   Gastrointestinal: Negative for abdominal distention, abdominal pain, blood in stool, constipation, diarrhea, nausea and vomiting.   Endocrine: Negative for cold intolerance, heat intolerance, polydipsia, polyphagia and polyuria.   Musculoskeletal: Negative for arthralgias, back pain, joint swelling, myalgias, neck pain and neck stiffness.   Skin: Negative for color change, pallor, rash and wound.   Allergic/Immunologic: Negative for environmental allergies, food allergies and immunocompromised state.   Neurological: headache. Negative for dizziness, tremors, seizures, weakness, numbness.  Hematological: Negative for adenopathy. Does not bruise/bleed easily.   Psychiatric/Behavioral: Negative for sleep disturbances, agitation, behavioral problems, confusion, hallucinations, self-injury and suicidal ideas.  "The patient is not nervous/anxious.      Objective    Physical Exam  Constitutional: No acute distress. Appears well-developed and well-nourished.   HENT: mild OP erythema. Left TM mildly erythematous and bulging.   Head: frontal and maxillary sinus tenderness. Normocephalic and atraumatic.   Eyes: EOM are normal. Pupils are equal, round, and reactive to light.   Neck: Normal range of motion. Neck supple.   Cardiovascular: Normal rate, regular rhythm and normal heart sounds.    Pulmonary/Chest: coarse breath sounds right upper with mild wheeze.  Effort normal. No respiratory distress.  Has no rales. Exhibits no chest wall tenderness.   Abdominal: Soft. Bowel sounds are normal. Exhibits no distension and no mass. There is no tenderness.   Musculoskeletal: Normal range of motion. Exhibits no tenderness.   Neurological: Alert and oriented to person, place, and time.   Skin: Skin is warm and dry.   Psychiatric: Has a normal mood and affect. Behavior is normal. Judgment and thought content normal.         /86   Pulse 70   Temp 98.3 °F (36.8 °C)   Ht 170.2 cm (67.01\")   Wt 105 kg (232 lb)   SpO2 98%   BMI 36.33 kg/m²     Nursing note and vitals reviewed.        Assessment/Plan   America was seen today for sinus problem and cough.    Diagnoses and all orders for this visit:    Acute non-recurrent pansinusitis  -     amoxicillin-clavulanate (AUGMENTIN) 875-125 MG per tablet; Take 1 tablet by mouth 2 (Two) Times a Day for 10 days.    Shortness of breath  -     albuterol (PROVENTIL HFA;VENTOLIN HFA) 108 (90 Base) MCG/ACT inhaler; Inhale 2 puffs Every 4 (Four) Hours As Needed for Wheezing or Shortness of Air.    Other orders  -     fluconazole (DIFLUCAN) 150 MG tablet; Take 1 tablet every other day as needed for yeast.    continue neti-pot and Jacquelyn-[place] prn. Discontinue nasal decongestant due to concern for rebound decongestant.       Call or RTC if symptoms worsen or persist.            "

## 2018-07-25 ENCOUNTER — DOCUMENTATION (OUTPATIENT)
Dept: INTERNAL MEDICINE | Facility: CLINIC | Age: 38
End: 2018-07-25

## 2018-07-25 RX ORDER — AZITHROMYCIN 250 MG/1
TABLET, FILM COATED ORAL
Qty: 6 TABLET | Refills: 0 | Status: SHIPPED | OUTPATIENT
Start: 2018-07-25 | End: 2018-09-06

## 2018-08-06 DIAGNOSIS — E66.09 CLASS 2 OBESITY DUE TO EXCESS CALORIES WITHOUT SERIOUS COMORBIDITY WITH BODY MASS INDEX (BMI) OF 38.0 TO 38.9 IN ADULT: ICD-10-CM

## 2018-08-06 RX ORDER — PHENTERMINE HYDROCHLORIDE 15 MG/1
15 CAPSULE ORAL 2 TIMES DAILY PRN
Qty: 60 CAPSULE | Refills: 0 | Status: SHIPPED | OUTPATIENT
Start: 2018-08-06 | End: 2021-04-29

## 2018-08-06 NOTE — TELEPHONE ENCOUNTER
----- Message from America Bonilla sent at 8/5/2018  5:55 PM EDT -----  Regarding: Prescription Question  Contact: [phone]  I need a Phentermine refill

## 2018-09-06 ENCOUNTER — OFFICE VISIT (OUTPATIENT)
Dept: INTERNAL MEDICINE | Facility: CLINIC | Age: 38
End: 2018-09-06

## 2018-09-06 VITALS
HEART RATE: 74 BPM | RESPIRATION RATE: 18 BRPM | OXYGEN SATURATION: 98 % | BODY MASS INDEX: 36.1 KG/M2 | TEMPERATURE: 98.2 F | DIASTOLIC BLOOD PRESSURE: 86 MMHG | WEIGHT: 230 LBS | SYSTOLIC BLOOD PRESSURE: 120 MMHG | HEIGHT: 67 IN

## 2018-09-06 DIAGNOSIS — L98.9 SKIN LESION: ICD-10-CM

## 2018-09-06 DIAGNOSIS — H10.13 ALLERGIC CONJUNCTIVITIS OF BOTH EYES: ICD-10-CM

## 2018-09-06 DIAGNOSIS — E66.9 OBESITY (BMI 35.0-39.9 WITHOUT COMORBIDITY): Primary | ICD-10-CM

## 2018-09-06 PROCEDURE — 99214 OFFICE O/P EST MOD 30 MIN: CPT | Performed by: FAMILY MEDICINE

## 2018-09-06 RX ORDER — CLINDAMYCIN PHOSPHATE 10 MG/G
1 AEROSOL, FOAM TOPICAL DAILY
Qty: 100 G | Refills: 2 | Status: SHIPPED | OUTPATIENT
Start: 2018-09-06 | End: 2021-04-29

## 2018-09-06 RX ORDER — OLOPATADINE HYDROCHLORIDE 2 MG/ML
1 SOLUTION/ DROPS OPHTHALMIC DAILY
Qty: 1 BOTTLE | Refills: 2 | Status: SHIPPED | OUTPATIENT
Start: 2018-09-06 | End: 2021-04-29

## 2018-09-06 RX ORDER — TOPIRAMATE 25 MG/1
25 TABLET ORAL 2 TIMES DAILY
Qty: 60 TABLET | Refills: 2 | Status: SHIPPED | OUTPATIENT
Start: 2018-09-06 | End: 2021-04-29

## 2018-09-06 NOTE — ASSESSMENT & PLAN NOTE
That he suspect that the patient's skin lesions are secondary to bacterial infection.  Will treat with clindamycin topically.  Patient also advised she may use Hibiclens to prevent infection.

## 2018-09-06 NOTE — PROGRESS NOTES
America Bonilla is a 38 y.o. female.    Chief Complaint   Patient presents with   • Eye Problem     stay red   • Rash     bites   • Weight Loss     wants phentermine       HPI   Patient is concerned about weight loss today.  She has been on phenteramine with some response over the last several months.  She has tried other therapies in the past, but is unsure of her response.  She has difficulty with exercise and diet due to busy schedule and small children.     Patient reports bites/sores to arms and legs.  No one in the house other than her have these lesions. She has applied neosporin.  These lesions are not painful and do not itch.  She has not picked at these lesions.    Patient also reports redness to eyes.  This has been ongoing for the past week or 2.  She is not sure if there are any allergens in her work environment.  She does have a history of dry.  She did go see an urgent care facility last week and was given an antibiotic ointment for her eyes with no response.    The following portions of the patient's history were reviewed and updated as appropriate: allergies, current medications, past family history, past medical history, past social history, past surgical history and problem list.     No Known Allergies      Current Outpatient Prescriptions:   •  buPROPion XL (WELLBUTRIN XL) 300 MG 24 hr tablet, Take 1 tablet by mouth Every Morning., Disp: 90 tablet, Rfl: 1  •  levonorgestrel (MIRENA) 20 MCG/24HR IUD, by Intrauterine route. USE AS DIRECTED, Disp: , Rfl:   •  phentermine 15 MG capsule, Take 1 capsule by mouth 2 (Two) Times a Day As Needed (cravings)., Disp: 60 capsule, Rfl: 0  •  Clindamycin Phosphate 1 % foam, Apply 1 dose topically to the appropriate area as directed Daily., Disp: 100 g, Rfl: 2  •  olopatadine (PATADAY) 0.2 % solution ophthalmic solution, Administer 1 drop to both eyes Daily., Disp: 1 bottle, Rfl: 2  •  topiramate (TOPAMAX) 25 MG tablet, Take 1 tablet by mouth 2 (Two) Times a  "Day., Disp: 60 tablet, Rfl: 2    ROS    Review of Systems   Constitutional: Negative for chills, fatigue and fever.   HENT: Negative for congestion, postnasal drip and sore throat.    Eyes: Positive for redness. Negative for blurred vision and visual disturbance.   Respiratory: Negative for cough, shortness of breath and wheezing.    Cardiovascular: Negative for chest pain and leg swelling.   Gastrointestinal: Negative for abdominal pain, constipation, diarrhea, nausea and vomiting.   Musculoskeletal: Negative for arthralgias and back pain.   Skin: Positive for rash. Negative for color change.   Neurological: Negative for weakness, numbness and headache.       Vitals:    09/06/18 1604   BP: 120/86   Pulse: 74   Resp: 18   Temp: 98.2 °F (36.8 °C)   SpO2: 98%   Weight: 104 kg (230 lb)   Height: 170.2 cm (67\")     Body mass index is 36.02 kg/m².    Physical Exam     Physical Exam   Constitutional: She is oriented to person, place, and time. She appears well-developed and well-nourished. No distress.   HENT:   Head: Normocephalic and atraumatic.   Right Ear: External ear normal.   Left Ear: External ear normal.   Mouth/Throat: Oropharynx is clear and moist.   Eyes: Pupils are equal, round, and reactive to light. Conjunctivae and EOM are normal.   Neck: Normal range of motion. Neck supple.   Cardiovascular: Normal rate and regular rhythm.    No murmur heard.  Pulmonary/Chest: Effort normal and breath sounds normal. No respiratory distress. She has no wheezes.   Abdominal: Soft. Bowel sounds are normal. She exhibits no distension. There is no tenderness.   Musculoskeletal: Normal range of motion. She exhibits no edema.   Lymphadenopathy:     She has no cervical adenopathy.   Neurological: She is alert and oriented to person, place, and time. No cranial nerve deficit.   Skin: Skin is warm and dry.   Psychiatric: She has a normal mood and affect.       Assessment/Plan    Problem List Items Addressed This Visit     Obesity " (BMI 35.0-39.9 without comorbidity) - Primary     Discussed with the patient that it is not safe for her to continue phentermine for several minutes at a time.  Discussed that I typically do 3 months on and 3 minutes off.  I have advised her to take a break from this medication for the next 3 months.  Will try Topamax for weight loss during that time.         Allergic conjunctivitis of both eyes     Will treat with Pataday ophthalmic solution.         Relevant Medications    olopatadine (PATADAY) 0.2 % solution ophthalmic solution    Skin lesion     That he suspect that the patient's skin lesions are secondary to bacterial infection.  Will treat with clindamycin topically.  Patient also advised she may use Hibiclens to prevent infection.         Relevant Medications    Clindamycin Phosphate 1 % foam          New Medications Ordered This Visit   Medications   • topiramate (TOPAMAX) 25 MG tablet     Sig: Take 1 tablet by mouth 2 (Two) Times a Day.     Dispense:  60 tablet     Refill:  2   • olopatadine (PATADAY) 0.2 % solution ophthalmic solution     Sig: Administer 1 drop to both eyes Daily.     Dispense:  1 bottle     Refill:  2   • Clindamycin Phosphate 1 % foam     Sig: Apply 1 dose topically to the appropriate area as directed Daily.     Dispense:  100 g     Refill:  2       No orders of the defined types were placed in this encounter.      Return in about 3 months (around 12/6/2018) for Next scheduled follow up.    Ana Hayes,

## 2018-09-06 NOTE — ASSESSMENT & PLAN NOTE
Discussed with the patient that it is not safe for her to continue phentermine for several minutes at a time.  Discussed that I typically do 3 months on and 3 minutes off.  I have advised her to take a break from this medication for the next 3 months.  Will try Topamax for weight loss during that time.

## 2018-12-11 ENCOUNTER — TELEPHONE (OUTPATIENT)
Dept: INTERNAL MEDICINE | Facility: CLINIC | Age: 38
End: 2018-12-11

## 2018-12-11 NOTE — TELEPHONE ENCOUNTER
Patient requesting medical records. Faxed blank request to patient at [phone], informed that she must mail the original back.

## 2019-01-25 RX ORDER — FLUCONAZOLE 150 MG/1
150 TABLET ORAL ONCE
Qty: 1 TABLET | Refills: 1 | Status: SHIPPED | OUTPATIENT
Start: 2019-01-25 | End: 2019-01-25

## 2019-03-18 DIAGNOSIS — F41.1 GENERALIZED ANXIETY DISORDER: ICD-10-CM

## 2019-03-18 RX ORDER — BUPROPION HYDROCHLORIDE 300 MG/1
300 TABLET ORAL EVERY MORNING
Qty: 90 TABLET | Refills: 1 | Status: SHIPPED | OUTPATIENT
Start: 2019-03-18 | End: 2019-09-14 | Stop reason: SDUPTHER

## 2019-09-14 DIAGNOSIS — F41.1 GENERALIZED ANXIETY DISORDER: ICD-10-CM

## 2019-09-14 RX ORDER — BUPROPION HYDROCHLORIDE 300 MG/1
300 TABLET ORAL EVERY MORNING
Qty: 90 TABLET | Refills: 1 | Status: SHIPPED | OUTPATIENT
Start: 2019-09-14 | End: 2021-04-29 | Stop reason: SDUPTHER

## 2020-04-10 RX ORDER — FLUCONAZOLE 150 MG/1
150 TABLET ORAL ONCE
Qty: 1 TABLET | Refills: 0 | Status: SHIPPED | OUTPATIENT
Start: 2020-04-10 | End: 2020-09-18

## 2020-09-18 RX ORDER — FLUCONAZOLE 150 MG/1
TABLET ORAL
Qty: 1 TABLET | Refills: 0 | Status: SHIPPED | OUTPATIENT
Start: 2020-09-18 | End: 2020-10-20 | Stop reason: SDUPTHER

## 2020-10-20 RX ORDER — CEPHALEXIN 500 MG/1
500 CAPSULE ORAL 3 TIMES DAILY
Qty: 21 CAPSULE | Refills: 0 | Status: SHIPPED | OUTPATIENT
Start: 2020-10-20 | End: 2020-10-27

## 2020-10-20 RX ORDER — FLUCONAZOLE 150 MG/1
150 TABLET ORAL ONCE
Qty: 1 TABLET | Refills: 0 | Status: SHIPPED | OUTPATIENT
Start: 2020-10-20 | End: 2020-10-20

## 2020-11-20 RX ORDER — FLUCONAZOLE 150 MG/1
150 TABLET ORAL ONCE
Qty: 1 TABLET | Refills: 0 | Status: SHIPPED | OUTPATIENT
Start: 2020-11-20 | End: 2020-11-20

## 2020-11-24 RX ORDER — FLUCONAZOLE 150 MG/1
TABLET ORAL
Qty: 1 TABLET | Refills: 0 | OUTPATIENT
Start: 2020-11-24

## 2020-11-24 RX ORDER — FLUCONAZOLE 150 MG/1
150 TABLET ORAL ONCE
Qty: 1 TABLET | Refills: 0 | Status: SHIPPED | OUTPATIENT
Start: 2020-11-24 | End: 2020-11-24

## 2021-03-31 ENCOUNTER — DOCUMENTATION (OUTPATIENT)
Dept: INTERNAL MEDICINE | Facility: CLINIC | Age: 41
End: 2021-03-31

## 2021-03-31 RX ORDER — FLUCONAZOLE 150 MG/1
150 TABLET ORAL ONCE
Qty: 1 TABLET | Refills: 0 | Status: SHIPPED | OUTPATIENT
Start: 2021-03-31 | End: 2021-03-31

## 2021-04-29 ENCOUNTER — OFFICE VISIT (OUTPATIENT)
Dept: INTERNAL MEDICINE | Facility: CLINIC | Age: 41
End: 2021-04-29

## 2021-04-29 VITALS
BODY MASS INDEX: 35.47 KG/M2 | DIASTOLIC BLOOD PRESSURE: 82 MMHG | WEIGHT: 226 LBS | SYSTOLIC BLOOD PRESSURE: 128 MMHG | OXYGEN SATURATION: 98 % | HEART RATE: 70 BPM | HEIGHT: 67 IN | TEMPERATURE: 97.5 F

## 2021-04-29 DIAGNOSIS — E66.09 CLASS 2 OBESITY DUE TO EXCESS CALORIES WITHOUT SERIOUS COMORBIDITY WITH BODY MASS INDEX (BMI) OF 35.0 TO 35.9 IN ADULT: ICD-10-CM

## 2021-04-29 DIAGNOSIS — Z00.00 ANNUAL PHYSICAL EXAM: Primary | ICD-10-CM

## 2021-04-29 DIAGNOSIS — F41.1 GENERALIZED ANXIETY DISORDER: ICD-10-CM

## 2021-04-29 PROBLEM — L98.9 SKIN LESION: Status: RESOLVED | Noted: 2018-09-06 | Resolved: 2021-04-29

## 2021-04-29 PROBLEM — H10.13 ALLERGIC CONJUNCTIVITIS OF BOTH EYES: Status: RESOLVED | Noted: 2018-09-06 | Resolved: 2021-04-29

## 2021-04-29 PROCEDURE — 99395 PREV VISIT EST AGE 18-39: CPT | Performed by: PHYSICIAN ASSISTANT

## 2021-04-29 RX ORDER — PHENTERMINE HYDROCHLORIDE 37.5 MG/1
37.5 TABLET ORAL
Qty: 30 TABLET | Refills: 0 | Status: SHIPPED | OUTPATIENT
Start: 2021-04-29 | End: 2021-05-25

## 2021-04-29 RX ORDER — MULTIPLE VITAMINS W/ MINERALS TAB 9MG-400MCG
1 TAB ORAL DAILY
COMMUNITY

## 2021-04-29 RX ORDER — BUPROPION HYDROCHLORIDE 300 MG/1
300 TABLET ORAL EVERY MORNING
Qty: 90 TABLET | Refills: 3 | Status: SHIPPED | OUTPATIENT
Start: 2021-04-29 | End: 2021-08-10

## 2021-04-29 RX ORDER — BUSPIRONE HYDROCHLORIDE 10 MG/1
10 TABLET ORAL 3 TIMES DAILY PRN
Qty: 90 TABLET | Refills: 5 | Status: SHIPPED | OUTPATIENT
Start: 2021-04-29
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
## 2021-05-03 LAB — DRUGS UR: NORMAL

## 2021-05-05 RX ORDER — METHYLPREDNISOLONE 4 MG/1
TABLET ORAL
Qty: 1 EACH | Refills: 0 | Status: SHIPPED | OUTPATIENT
Start: 2021-05-05 | End: 2021-08-10

## 2021-05-06 DIAGNOSIS — M25.50 POLYARTHRALGIA: ICD-10-CM

## 2021-05-06 DIAGNOSIS — R59.1 PERSISTENT GENERALIZED LYMPHADENOPATHY: Primary | ICD-10-CM

## 2021-05-08 LAB
ALBUMIN SERPL-MCNC: 4.6 G/DL (ref 3.8–4.8)
ALBUMIN/GLOB SERPL: 1.8 {RATIO} (ref 1.2–2.2)
ALP SERPL-CCNC: 69 IU/L (ref 39–117)
ALT SERPL-CCNC: 43 IU/L (ref 0–32)
ANA SER QL: NEGATIVE
AST SERPL-CCNC: 28 IU/L (ref 0–40)
B BURGDOR IGG+IGM SER-ACNC: <0.91 ISR (ref 0–0.9)
BASOPHILS # BLD AUTO: 0 X10E3/UL (ref 0–0.2)
BASOPHILS NFR BLD AUTO: 0 %
BILIRUB SERPL-MCNC: 0.4 MG/DL (ref 0–1.2)
BUN SERPL-MCNC: 12 MG/DL (ref 6–20)
BUN/CREAT SERPL: 16 (ref 9–23)
CALCIUM SERPL-MCNC: 9.6 MG/DL (ref 8.7–10.2)
CCP IGA+IGG SERPL IA-ACNC: <1 UNITS (ref 0–19)
CHLORIDE SERPL-SCNC: 99 MMOL/L (ref 96–106)
CO2 SERPL-SCNC: 24 MMOL/L (ref 20–29)
CREAT SERPL-MCNC: 0.73 MG/DL (ref 0.57–1)
CRP SERPL-MCNC: 2 MG/L (ref 0–10)
EOSINOPHIL # BLD AUTO: 0 X10E3/UL (ref 0–0.4)
EOSINOPHIL NFR BLD AUTO: 0 %
ERYTHROCYTE [DISTWIDTH] IN BLOOD BY AUTOMATED COUNT: 11.5 % (ref 11.7–15.4)
ERYTHROCYTE [SEDIMENTATION RATE] IN BLOOD BY WESTERGREN METHOD: 15 MM/HR (ref 0–32)
GLOBULIN SER CALC-MCNC: 2.5 G/DL (ref 1.5–4.5)
GLUCOSE SERPL-MCNC: 96 MG/DL (ref 65–99)
HCT VFR BLD AUTO: 42.9 % (ref 34–46.6)
HGB BLD-MCNC: 14.5 G/DL (ref 11.1–15.9)
HIV 1+2 AB+HIV1 P24 AG SERPL QL IA: NON REACTIVE
IMM GRANULOCYTES # BLD AUTO: 0 X10E3/UL (ref 0–0.1)
IMM GRANULOCYTES NFR BLD AUTO: 0 %
LYMPHOCYTES # BLD AUTO: 1.7 X10E3/UL (ref 0.7–3.1)
LYMPHOCYTES NFR BLD AUTO: 21 %
MCH RBC QN AUTO: 31.3 PG (ref 26.6–33)
MCHC RBC AUTO-ENTMCNC: 33.8 G/DL (ref 31.5–35.7)
MCV RBC AUTO: 93 FL (ref 79–97)
MONOCYTES # BLD AUTO: 0.3 X10E3/UL (ref 0.1–0.9)
MONOCYTES NFR BLD AUTO: 4 %
NEUTROPHILS # BLD AUTO: 6 X10E3/UL (ref 1.4–7)
NEUTROPHILS NFR BLD AUTO: 75 %
PLATELET # BLD AUTO: 207 X10E3/UL (ref 150–450)
POTASSIUM SERPL-SCNC: 4 MMOL/L (ref 3.5–5.2)
PROT SERPL-MCNC: 7.1 G/DL (ref 6–8.5)
RBC # BLD AUTO: 4.63 X10E6/UL (ref 3.77–5.28)
RHEUMATOID FACT SERPL-ACNC: 13.2 IU/ML (ref 0–13.9)
RPR SER QL: NON REACTIVE
SODIUM SERPL-SCNC: 137 MMOL/L (ref 134–144)
WBC # BLD AUTO: 8 X10E3/UL (ref 3.4–10.8)

## 2021-05-25 DIAGNOSIS — E66.09 CLASS 2 OBESITY DUE TO EXCESS CALORIES WITHOUT SERIOUS COMORBIDITY WITH BODY MASS INDEX (BMI) OF 35.0 TO 35.9 IN ADULT: ICD-10-CM

## 2021-05-25 RX ORDER — PHENTERMINE HYDROCHLORIDE 37.5 MG/1
TABLET ORAL
Qty: 30 TABLET | Refills: 0 | Status: SHIPPED | OUTPATIENT
Start: 2021-05-25 | End: 2021-08-03 | Stop reason: SDUPTHER

## 2021-07-23 DIAGNOSIS — E66.09 CLASS 2 OBESITY DUE TO EXCESS CALORIES WITHOUT SERIOUS COMORBIDITY WITH BODY MASS INDEX (BMI) OF 35.0 TO 35.9 IN ADULT: ICD-10-CM

## 2021-07-23 RX ORDER — PHENTERMINE HYDROCHLORIDE 37.5 MG/1
TABLET ORAL
Qty: 30 TABLET | Refills: 0 | OUTPATIENT
Start: 2021-07-23

## 2021-08-03 ENCOUNTER — TELEPHONE (OUTPATIENT)
Dept: INTERNAL MEDICINE | Facility: CLINIC | Age: 41
End: 2021-08-03

## 2021-08-03 DIAGNOSIS — E66.09 CLASS 2 OBESITY DUE TO EXCESS CALORIES WITHOUT SERIOUS COMORBIDITY WITH BODY MASS INDEX (BMI) OF 35.0 TO 35.9 IN ADULT: ICD-10-CM

## 2021-08-03 RX ORDER — PHENTERMINE HYDROCHLORIDE 37.5 MG/1
37.5 TABLET ORAL
Qty: 30 TABLET | Refills: 0 | Status: SHIPPED | OUTPATIENT
Start: 2021-08-03 | End: 2021-09-01 | Stop reason: SDUPTHER

## 2021-08-03 NOTE — TELEPHONE ENCOUNTER
Caller: America Bonilla    Relationship: Self    Best call back number: [phone] (H)    Medication needed:     phentermine (ADIPEX-P) 37.5 MG tablet   0 ordered         Summary: TAKE ONE TABLET BY MOUTH EVERY MORNING BEFORE BREAKFAST, Normal          When do you need the refill by: TODAY     What additional details did the patient provide when requesting the medication: PATIENT STATES THAT SHE IS COMPLETELY OUT AND HAS SCHEDULED AN APPOINTMENT FOR A MEDICATION REVIEW/ REFILL FOR 8/10/21 @ 3:45 PM. PATIENT STATES THAT SHE WILL NEED A PRESCRIPTION PRIOR TO THE APPOINTMENT.     Does the patient have less than a 3 day supply:  [x] Yes  [] No    What is the patient's preferred pharmacy:      [facility] [address] [phone] [facility] [phone] FX      PATIENT HAS BEEN ADVISED THAT THIS REQUEST HAS BEEN MARKED AS A HIGH PRIORITY TO ALLOW 48 HOURS FOR THE CLINICAL TEAM TO FOLLOW UP ON THIS REQUEST,  IF SYMPTOMS WORSENS TO SEEK OUT EMERGENT CARE. PATIENT  FULLY UNDERSTANDS.

## 2021-08-10 ENCOUNTER — OFFICE VISIT (OUTPATIENT)
Dept: INTERNAL MEDICINE | Facility: CLINIC | Age: 41
End: 2021-08-10

## 2021-08-10 VITALS
TEMPERATURE: 97.3 F | WEIGHT: 209 LBS | HEIGHT: 67 IN | HEART RATE: 73 BPM | DIASTOLIC BLOOD PRESSURE: 72 MMHG | OXYGEN SATURATION: 99 % | SYSTOLIC BLOOD PRESSURE: 126 MMHG | BODY MASS INDEX: 32.8 KG/M2

## 2021-08-10 DIAGNOSIS — E66.09 CLASS 1 OBESITY DUE TO EXCESS CALORIES WITH SERIOUS COMORBIDITY AND BODY MASS INDEX (BMI) OF 32.0 TO 32.9 IN ADULT: Primary | ICD-10-CM

## 2021-08-10 PROCEDURE — 99213 OFFICE O/P EST LOW 20 MIN: CPT | Performed by: PHYSICIAN ASSISTANT

## 2021-08-10 NOTE — PROGRESS NOTES
"     Follow Up Office Visit      Patient Name: America Bonilla  : 1980   MRN: [id number]     Chief Complaint:    Chief Complaint   Patient presents with   • Follow-up     Class 2 obesity due to excess calories       History of Present Illness: America Bonilla is a 41 y.o. female who is here today for follow up of obesity. She was prescribed Adipex initially 4 months ago and in that time has taken it intermittently and lost 17 pounds. She did go around 1 month without Adipex in  due to losing or accidentally throwing away the prescription. She has been eating healthier and exercising more.      She stopped taking Wellbutrin around 1 month ago and feels she is doing well without it. She is not currently bothered by anxiety or depression.          Subjective      I have reviewed and the following portions of the patient's history were updated as appropriate: past family history, past medical history, past social history, past surgical history and problem list.      Current Outpatient Medications:   •  busPIRone (BUSPAR) 10 MG tablet, Take 1 tablet by mouth 3 (Three) Times a Day As Needed (anxiety)., Disp: 90 tablet, Rfl: 5  •  levonorgestrel (MIRENA) 20 MCG/24HR IUD, by Intrauterine route. USE AS DIRECTED, Disp: , Rfl:   •  multivitamin with minerals tablet tablet, Take 1 tablet by mouth Daily., Disp: , Rfl:   •  phentermine (ADIPEX-P) 37.5 MG tablet, Take 1 tablet by mouth Every Morning Before Breakfast., Disp: 30 tablet, Rfl: 0  •  VITAMIN D, CHOLECALCIFEROL, PO, Take  by mouth., Disp: , Rfl:     No Known Allergies    Objective     Physical Exam:  Vital Signs:   Vitals:    08/10/21 1550   BP: 126/72   Pulse: 73   Temp: 97.3 °F (36.3 °C)   SpO2: 99%   Weight: 94.8 kg (209 lb)   Height: 170.2 cm (67.01\")     Body mass index is 32.73 kg/m².    Physical Exam  Vitals and nursing note reviewed.   Constitutional:       General: She is not in acute distress.     Appearance: She is well-developed. She is obese. " She is not ill-appearing, toxic-appearing or diaphoretic.   HENT:      Head: Normocephalic and atraumatic.      Right Ear: External ear normal.      Left Ear: External ear normal.   Eyes:      General: No scleral icterus.     Extraocular Movements: Extraocular movements intact.      Conjunctiva/sclera: Conjunctivae normal.      Pupils: Pupils are equal, round, and reactive to light.   Cardiovascular:      Rate and Rhythm: Normal rate and regular rhythm.      Heart sounds: Normal heart sounds. No murmur heard.   No friction rub. No gallop.    Pulmonary:      Effort: Pulmonary effort is normal. No respiratory distress.      Breath sounds: Normal breath sounds. No wheezing, rhonchi or rales.   Chest:      Chest wall: No tenderness.   Abdominal:      General: Bowel sounds are normal.      Palpations: Abdomen is soft.      Tenderness: There is no abdominal tenderness.   Musculoskeletal:         General: No tenderness or deformity. Normal range of motion.      Cervical back: Normal range of motion and neck supple.      Right lower leg: No edema.      Left lower leg: No edema.   Skin:     General: Skin is warm and dry.      Capillary Refill: Capillary refill takes less than 2 seconds.      Coloration: Skin is not jaundiced or pale.      Findings: No erythema or rash.   Neurological:      Mental Status: She is alert and oriented to person, place, and time.      Cranial Nerves: No cranial nerve deficit.      Sensory: No sensory deficit.      Motor: No abnormal muscle tone.      Coordination: Coordination normal.      Deep Tendon Reflexes: Reflexes normal.   Psychiatric:         Mood and Affect: Mood normal.         Behavior: Behavior normal.         Thought Content: Thought content normal.         Judgment: Judgment normal.             Common labs    Common Labsle 5/6/21 5/6/21    1103 1103   Glucose  96   BUN  12   Creatinine  0.73   eGFR Non  Am  104   eGFR African Am  120   Sodium  137   Potassium  4.0   Chloride   "99   Calcium  9.6   Total Protein  7.1   Albumin  4.6   Total Bilirubin  0.4   Alkaline Phosphatase  69   AST (SGOT)  28   ALT (SGPT)  43 (A)   WBC 8.0    Hemoglobin 14.5    Hematocrit 42.9    Platelets 207    (A) Abnormal value       Comments are available for some flowsheets but are not being displayed.               Assessment / Plan      Assessment/Plan:   Diagnoses and all orders for this visit:    1. Class 1 obesity due to excess calories with serious comorbidity and body mass index (BMI) of 32.0 to 32.9 in adult (Primary)     Patient's (Body mass index is 32.73 kg/m².) indicates that they are obese (BMI >30) with health related conditions that include none . Weight is improving with treatment. BMI is is above average; BMI management plan is completed. We discussed portion control, increasing exercise and pharmacologic options including Adipex.     She has proved successful weight loss with the help of Adipex and is eligible to continue for one additional month after completion of her current prescription. CY reviewed and compliant. Discussed use of Adipex as adjunct to diet and exercise. Medicine alone will not \"solve\" weight issue. Must make changes to lifestyle. May use Adipex up to 12 weeks. After 12 weeks, efficacy wanes and risks outweigh benefit of use. Warned patient weight may return if necessary lifestyle changes not implemented.    CONTROLLED SUBSTANCE TRACKING 8/6/2018 4/29/2021 8/3/2021   Last [facility] 8/6/2018 4/29/2021 8/3/2021   Report Number - [id number]   Last UDS - 4/29/2021 -       I spent 22 minutes caring for America on this date of service. This time includes time spent by me in the following activities:preparing for the visit, reviewing tests, performing a medically appropriate examination and/or evaluation , counseling and educating the patient/family/caregiver and documenting information in the medical record    Follow Up:   Return in about 6 months (around 2/10/2022) for " Annual physical.    Patient was given instructions and counseling regarding her condition or for health maintenance advice. Please see specific information pulled into the AVS if appropriate.     Demetria Kelyl PA-C  Primary Care [facility]     Please note that portions of this note may have been completed with a voice recognition program. Efforts were made to edit the dictations, but occasionally words are mistranscribed.

## 2021-09-01 DIAGNOSIS — E66.09 CLASS 2 OBESITY DUE TO EXCESS CALORIES WITHOUT SERIOUS COMORBIDITY WITH BODY MASS INDEX (BMI) OF 35.0 TO 35.9 IN ADULT: ICD-10-CM

## 2021-09-02 RX ORDER — PHENTERMINE HYDROCHLORIDE 37.5 MG/1
37.5 TABLET ORAL
Qty: 30 TABLET | Refills: 0 | Status: SHIPPED | OUTPATIENT
Start: 2021-09-02 | End: 2021-09-07 | Stop reason: SDUPTHER

## 2021-09-07 DIAGNOSIS — E66.09 CLASS 2 OBESITY DUE TO EXCESS CALORIES WITHOUT SERIOUS COMORBIDITY WITH BODY MASS INDEX (BMI) OF 35.0 TO 35.9 IN ADULT: ICD-10-CM

## 2021-09-07 RX ORDER — PHENTERMINE HYDROCHLORIDE 37.5 MG/1
37.5 TABLET ORAL
Qty: 30 TABLET | Refills: 0 | Status: SHIPPED | OUTPATIENT
Start: 2021-09-07

## 2021-09-07 NOTE — TELEPHONE ENCOUNTER
Caller: America Bonilla    Relationship: Self    Best call back number: [phone]    Medication needed:   Requested Prescriptions     Pending Prescriptions Disp Refills   • phentermine (ADIPEX-P) 37.5 MG tablet 30 tablet 0     Sig: Take 1 tablet by mouth Every Morning Before Breakfast.       When do you need the refill by: 09/07/2021    What additional details did the patient provide when requesting the medication:   PATIENT WAS INFORMED THAT [facility] DID NOT HAVE THIS MEDICATION IN STOCK AT THIS TIME AND PATIENT WOULD LIKE THIS MEDICATION CALLED INTO [facility]'S     PATIENT IS COMPLETELY OUT OF MEDICATION     Does the patient have less than a 3 day supply:  [x] Yes  [] No    What is the patient's preferred pharmacy: [id number] [place] [address] [address] AT [facility] [address] - [phone]  - [phone] FX           
Needs to be sent to [facility]  
Never smoker

## 2021-10-01 ENCOUNTER — DOCUMENTATION (OUTPATIENT)
Dept: INTERNAL MEDICINE | Facility: CLINIC | Age: 41
End: 2021-10-01

## 2021-10-01 RX ORDER — FLUCONAZOLE 150 MG/1
150 TABLET ORAL ONCE
Qty: 1 TABLET | Refills: 0 | Status: SHIPPED | OUTPATIENT
Start: 2021-10-01 | End: 2021-11-04

## 2021-11-04 RX ORDER — FLUCONAZOLE 150 MG/1
TABLET ORAL
Qty: 3 TABLET | Refills: 1 | Status: SHIPPED | OUTPATIENT
Start: 2021-11-04

## 2021-11-22 DIAGNOSIS — E66.09 CLASS 2 OBESITY DUE TO EXCESS CALORIES WITHOUT SERIOUS COMORBIDITY WITH BODY MASS INDEX (BMI) OF 35.0 TO 35.9 IN ADULT: ICD-10-CM

## 2021-11-22 RX ORDER — PHENTERMINE HYDROCHLORIDE 37.5 MG/1
TABLET ORAL
Qty: 30 TABLET | OUTPATIENT
Start: 2021-11-22

## 2021-11-30 ENCOUNTER — OFFICE VISIT (OUTPATIENT)
Dept: INTERNAL MEDICINE | Facility: CLINIC | Age: 41
End: 2021-11-30

## 2021-11-30 VITALS
BODY MASS INDEX: 31.39 KG/M2 | WEIGHT: 200 LBS | OXYGEN SATURATION: 98 % | HEART RATE: 78 BPM | HEIGHT: 67 IN | DIASTOLIC BLOOD PRESSURE: 72 MMHG | RESPIRATION RATE: 12 BRPM | TEMPERATURE: 97.1 F | SYSTOLIC BLOOD PRESSURE: 140 MMHG

## 2021-11-30 DIAGNOSIS — M54.2 CERVICAL MUSCLE PAIN: Primary | ICD-10-CM

## 2021-11-30 PROCEDURE — 99213 OFFICE O/P EST LOW 20 MIN: CPT | Performed by: INTERNAL MEDICINE

## 2021-11-30 RX ORDER — TIZANIDINE 2 MG/1
2 TABLET ORAL 2 TIMES DAILY PRN
Qty: 30 TABLET | Refills: 1 | Status: SHIPPED | OUTPATIENT
Start: 2021-11-30

## 2021-11-30 RX ORDER — IBUPROFEN 800 MG/1
800 TABLET ORAL EVERY 8 HOURS PRN
Qty: 30 TABLET | Refills: 1 | Status: SHIPPED | OUTPATIENT
Start: 2021-11-30 | End: 2022-08-13

## 2021-11-30 RX ORDER — IBUPROFEN 800 MG/1
800 TABLET ORAL EVERY 8 HOURS PRN
Qty: 30 TABLET | Refills: 1 | Status: SHIPPED | OUTPATIENT
Start: 2021-11-30 | End: 2021-11-30 | Stop reason: SDUPTHER

## 2021-11-30 RX ORDER — TIZANIDINE 2 MG/1
2 TABLET ORAL 2 TIMES DAILY PRN
Qty: 30 TABLET | Refills: 1 | Status: SHIPPED | OUTPATIENT
Start: 2021-11-30 | End: 2021-11-30 | Stop reason: SDUPTHER

## 2021-11-30 NOTE — PROGRESS NOTES
"Chief Complaint   Patient presents with   • Abstract     right shoulder pain, has gotten worse, four days ago pain started really bad.      Subjective   America Bonilla is a 41 y.o. female.     Here today with complaints of right shoulder pain that has worsened over past 4 days.    She denies any injury.  No over use or recent exercise.   She states pain started on Saturday.  She has baseline pain in upper shoulder and neck, always on computer at work.   She is having problems turning her neck to the right. Her pain worsens thru the day and at night her neck is more stiff.   The pain is less when she sits still.  She is alternating IBU and tylenol but it does not help much.  She has been using heat and tiger balm, used an icy hot patch this AM.    She is having some depression issues also due to home and life stressors, but is not interested in an antidepressant at this time.         The following portions of the patient's history were reviewed and updated as appropriate: allergies, current medications, past family history, past medical history, past social history, past surgical history and problem list.    Review of Systems   Musculoskeletal: Positive for myalgias, neck pain and neck stiffness.   Psychiatric/Behavioral: Positive for dysphoric mood.       Objective   /72 (BP Location: Right arm, Patient Position: Sitting, Cuff Size: Adult)   Pulse 78   Temp 97.1 °F (36.2 °C)   Resp 12   Ht 170.2 cm (67.01\")   Wt 90.7 kg (200 lb)   SpO2 98%   BMI 31.32 kg/m²   Body mass index is 31.32 kg/m².  Physical Exam  Vitals and nursing note reviewed.   Constitutional:       General: She is not in acute distress.     Appearance: Normal appearance.      Comments: Kind and pleasant female, appears her age and in no distress today   HENT:      Head: Normocephalic and atraumatic.      Right Ear: External ear normal.      Left Ear: External ear normal.   Eyes:      General:         Right eye: No discharge.         " Left eye: No discharge.      Extraocular Movements: Extraocular movements intact.   Pulmonary:      Effort: Pulmonary effort is normal. No respiratory distress.   Musculoskeletal:         General: Tenderness present. No swelling, deformity or signs of injury.      Comments: Neck with limited range of motion on right lateral flexion and right neck rotation, pain with palpation of right posterior strap of sternocleidomastoid muscle and over right rhomboid muscle area, mild tenderness of right trapezius also  Right shoulder with full range of motion in all planes, negative drop test   Neurological:      General: No focal deficit present.      Mental Status: She is alert and oriented to person, place, and time.      Cranial Nerves: No cranial nerve deficit.   Psychiatric:         Mood and Affect: Mood normal.         Behavior: Behavior normal.         Thought Content: Thought content normal.         Judgment: Judgment normal.         Assessment/Plan   America Bonilla is here today and the following problems have been addressed:      Diagnoses and all orders for this visit:    1. Cervical muscle pain (Primary)  -     XR Spine Cervical Complete 4 or 5 View    Other orders  -     Discontinue: tiZANidine (ZANAFLEX) 2 MG tablet; Take 1 tablet by mouth 2 (Two) Times a Day As Needed for Muscle Spasms.  Dispense: 30 tablet; Refill: 1  -     Discontinue: ibuprofen (ADVIL,MOTRIN) 800 MG tablet; Take 1 tablet by mouth Every 8 (Eight) Hours As Needed for Mild Pain .  Dispense: 30 tablet; Refill: 1  -     tiZANidine (ZANAFLEX) 2 MG tablet; Take 1 tablet by mouth 2 (Two) Times a Day As Needed for Muscle Spasms.  Dispense: 30 tablet; Refill: 1  -     ibuprofen (ADVIL,MOTRIN) 800 MG tablet; Take 1 tablet by mouth Every 8 (Eight) Hours As Needed for Mild Pain .  Dispense: 30 tablet; Refill: 1    Patient with muscle spasm palpable in the posterior strap of sternocleidomastoid muscle and upper medial trapezius muscle  Recommend heat to  area 3 times daily followed by tennis ball or racquetball massage and stretches as demonstrated in clinic  She was provided with tizanidine 2 mg tablet to take twice daily as needed for muscle spasms, may take in conjunction with ibuprofen 800 mg 3 times daily also, take with food  We discussed physical therapy, however her job precludes this due to amount of time she spends at work, recommend she look up more exercises on YouTube if needed  X-ray of cervical spine also ordered to determine if she has any significant DDD/DJD of cervical spine    Return to clinic as needed if symptoms worsen or persist    Please note that portions of this note were completed with a voice recognition program.  Efforts were made to edit dictation, but occasionally words are mistranscribed.

## 2022-08-13 RX ORDER — IBUPROFEN 800 MG/1
TABLET ORAL
Qty: 30 TABLET | Refills: 1 | Status: SHIPPED | OUTPATIENT
Start: 2022-08-13

## 2023-11-08 ENCOUNTER — HOSPITAL ENCOUNTER (EMERGENCY)
Facility: HOSPITAL | Age: 43
Discharge: HOME | End: 2023-11-08
Attending: STUDENT IN AN ORGANIZED HEALTH CARE EDUCATION/TRAINING PROGRAM | Admitting: STUDENT IN AN ORGANIZED HEALTH CARE EDUCATION/TRAINING PROGRAM
Payer: COMMERCIAL

## 2023-11-08 VITALS
RESPIRATION RATE: 18 BRPM | WEIGHT: 220 LBS | HEIGHT: 66 IN | OXYGEN SATURATION: 99 % | SYSTOLIC BLOOD PRESSURE: 149 MMHG | HEART RATE: 59 BPM | BODY MASS INDEX: 35.36 KG/M2 | TEMPERATURE: 98.5 F | DIASTOLIC BLOOD PRESSURE: 92 MMHG

## 2023-11-08 DIAGNOSIS — S39.012A LUMBOSACRAL STRAIN, INITIAL ENCOUNTER: Primary | ICD-10-CM

## 2023-11-08 PROCEDURE — 99283 EMERGENCY DEPT VISIT LOW MDM: CPT

## 2023-11-08 PROCEDURE — 25010000002 METHYLPREDNISOLONE PER 125 MG: Performed by: STUDENT IN AN ORGANIZED HEALTH CARE EDUCATION/TRAINING PROGRAM

## 2023-11-08 PROCEDURE — 96372 THER/PROPH/DIAG INJ SC/IM: CPT

## 2023-11-08 RX ORDER — LIDOCAINE 50 MG/G
1 PATCH TOPICAL ONCE
Status: DISCONTINUED | OUTPATIENT
Start: 2023-11-08 | End: 2023-11-08 | Stop reason: HOSPADM

## 2023-11-08 RX ORDER — NAPROXEN 500 MG/1
500 TABLET ORAL 2 TIMES DAILY PRN
Qty: 14 TABLET | Refills: 0 | Status: SHIPPED | OUTPATIENT
Start: 2023-11-08

## 2023-11-08 RX ORDER — ACETAMINOPHEN 500 MG
1000 TABLET ORAL ONCE
Status: COMPLETED | OUTPATIENT
Start: 2023-11-08 | End: 2023-11-08

## 2023-11-08 RX ORDER — OXYCODONE HYDROCHLORIDE AND ACETAMINOPHEN 5; 325 MG/1; MG/1
1 TABLET ORAL ONCE
Status: COMPLETED | OUTPATIENT
Start: 2023-11-08 | End: 2023-11-08

## 2023-11-08 RX ORDER — LIDOCAINE 50 MG/G
1 PATCH TOPICAL EVERY 24 HOURS
Qty: 15 EACH | Refills: 0 | Status: SHIPPED | OUTPATIENT
Start: 2023-11-08

## 2023-11-08 RX ORDER — METHYLPREDNISOLONE SODIUM SUCCINATE 125 MG/2ML
80 INJECTION, POWDER, LYOPHILIZED, FOR SOLUTION INTRAMUSCULAR; INTRAVENOUS ONCE
Status: COMPLETED | OUTPATIENT
Start: 2023-11-08 | End: 2023-11-08

## 2023-11-08 RX ORDER — TIZANIDINE HYDROCHLORIDE 2 MG/1
2 CAPSULE, GELATIN COATED ORAL 3 TIMES DAILY PRN
Qty: 30 CAPSULE | Refills: 0 | Status: SHIPPED | OUTPATIENT
Start: 2023-11-08

## 2023-11-08 RX ADMIN — LIDOCAINE 1 PATCH: 700 PATCH TOPICAL at 08:03

## 2023-11-08 RX ADMIN — ACETAMINOPHEN 1000 MG: 500 TABLET, FILM COATED ORAL at 08:03

## 2023-11-08 RX ADMIN — METHYLPREDNISOLONE SODIUM SUCCINATE 80 MG: 125 INJECTION, POWDER, FOR SOLUTION INTRAMUSCULAR; INTRAVENOUS at 08:03

## 2023-11-08 RX ADMIN — OXYCODONE HYDROCHLORIDE AND ACETAMINOPHEN 1 TABLET: 5; 325 TABLET ORAL at 08:44

## 2023-11-08 NOTE — DISCHARGE INSTRUCTIONS
You were evaluated for back pain.  Due to physical exam and were not concerned for a neurologic injury.  As we discussed, we believe that this is related to your chronic disc disease.  We have given you symptomatic treatment the emergency department given you prescription for symptomatic treatment.  We have also put in referral for you to follow-up with our spine specialist.  Please call them to schedule an appointment.  If you begin to have any issues with ambulation due to weakness, difficulties urinating or difficulties defecating or have an odd sensation between your vagina and rectum, please connect emergency department for further evaluation.  You are now stable for discharge.

## 2023-11-08 NOTE — ED PROVIDER NOTES
Subjective:  History of Present Illness:    Patient is a 43-year-old female with history of herniated disc disease,  Seizures who presents today with acute exacerbation of her chronic back pain.  Patient reports that she leaned down yesterday and has had increasing pain to her back.  Radiates down both legs.  Denies any difficulties with ambulation.  No issues with defecation or urination, no paresthesias to the peroneum.  Denies any preceding traumas.  Patient is not an IV drug user, no history of chronic steroid use, no history of underlying malignancy.  Denies any urinary symptoms, blood in the urine.  Denies any cough congestion or fever prior to arrival.    Nurses Notes reviewed and agree, including vitals, allergies, social history and prior medical history.     REVIEW OF SYSTEMS: All systems reviewed and not pertinent unless noted.  Review of Systems   Constitutional:  Negative for activity change, appetite change, chills, fatigue and fever.   HENT:  Negative for rhinorrhea, sinus pressure and sinus pain.    Eyes:  Negative for discharge and itching.   Respiratory:  Negative for cough and shortness of breath.    Cardiovascular:  Negative for chest pain and leg swelling.   Gastrointestinal:  Negative for abdominal distention, abdominal pain, nausea and vomiting.   Endocrine: Negative for cold intolerance and heat intolerance.   Genitourinary:  Negative for decreased urine volume, difficulty urinating, flank pain, frequency, urgency, vaginal bleeding, vaginal discharge and vaginal pain.   Musculoskeletal:  Positive for back pain. Negative for gait problem, neck pain and neck stiffness.   Skin:  Negative for color change.   Allergic/Immunologic: Negative for environmental allergies.   Neurological:  Negative for seizures, syncope, facial asymmetry and speech difficulty.   Psychiatric/Behavioral:  Negative for self-injury and suicidal ideas.        Past Medical History:   Diagnosis Date    Anemia     Annular tear  "of lumbar disc     Lumbar herniated disc     Ovarian cyst     Seizures     Tobacco abuse 6/14/2016    Vasovagal syncope        Allergies:    Patient has no known allergies.      Past Surgical History:   Procedure Laterality Date    BREAST SURGERY      ENLARGEMENT PROCEDURE    COLPOSCOPY      WITH LOOP ELECTRODE EXCISION OF THE CERVIX         Social History     Socioeconomic History    Marital status:    Tobacco Use    Smoking status: Former     Packs/day: 0.50     Years: 17.00     Additional pack years: 0.00     Total pack years: 8.50     Types: Cigarettes     Start date: 1996     Quit date: 11/29/2019     Years since quitting: 3.9    Smokeless tobacco: Never   Substance and Sexual Activity    Alcohol use: Not Currently    Drug use: No         Family History   Problem Relation Age of Onset    Thyroid disease Mother     Heart disease Father         ARTERIOSCLEROTIC CARDIOVASCULAR DISEASE    Hyperlipidemia Father     Hypertension Father     No Known Problems Brother     Cancer Maternal Grandmother     Breast cancer Maternal Grandmother     No Known Problems Maternal Grandfather     Alcohol abuse Paternal Grandmother     Liver disease Paternal Grandmother     Early death Paternal Grandfather        Objective  Physical Exam:  /92   Pulse 59   Temp 98.5 °F (36.9 °C) (Oral)   Resp 18   Ht 167.6 cm (66\")   Wt 99.8 kg (220 lb)   SpO2 99%   BMI 35.51 kg/m²      Physical Exam  Constitutional:       General: She is not in acute distress.     Appearance: Normal appearance. She is not ill-appearing.   HENT:      Head: Normocephalic and atraumatic.      Nose: Nose normal. No congestion or rhinorrhea.      Mouth/Throat:      Mouth: Mucous membranes are dry.      Pharynx: Oropharynx is clear. No oropharyngeal exudate or posterior oropharyngeal erythema.   Eyes:      Extraocular Movements: Extraocular movements intact.      Conjunctiva/sclera: Conjunctivae normal.      Pupils: Pupils are equal, round, and " reactive to light.   Cardiovascular:      Rate and Rhythm: Normal rate and regular rhythm.      Pulses: Normal pulses.      Heart sounds: Normal heart sounds.   Pulmonary:      Effort: Pulmonary effort is normal. No respiratory distress.      Breath sounds: Normal breath sounds.   Abdominal:      General: Abdomen is flat. Bowel sounds are normal. There is no distension.      Palpations: Abdomen is soft.      Tenderness: There is no abdominal tenderness.   Musculoskeletal:         General: No swelling or tenderness. Normal range of motion.      Cervical back: Normal range of motion and neck supple.   Skin:     General: Skin is warm and dry.      Capillary Refill: Capillary refill takes less than 2 seconds.   Neurological:      General: No focal deficit present.      Mental Status: She is alert and oriented to person, place, and time. Mental status is at baseline.      Cranial Nerves: No cranial nerve deficit.      Sensory: No sensory deficit.      Motor: No weakness.      Coordination: Coordination normal.      Comments: Patient endorsing sciatic type pain down both legs, positive straight leg raise.  Patient neurovascular intact to the lower extremities.  Denies any paresthesias to the peroneum.   Psychiatric:         Mood and Affect: Mood normal.         Behavior: Behavior normal.         Thought Content: Thought content normal.         Judgment: Judgment normal.         Procedures    ED Course:         Lab Results (last 24 hours)       ** No results found for the last 24 hours. **             No radiology results from the last 24 hrs       MDM      Initial impression of presenting illness: Lumbosacral pain    DDX: includes but is not limited to: Lumbar disc disease, compression fracture, pyelonephritis, cauda equina    Patient arrives stable with vitals interpreted by myself.     Pertinent features from physical exam: Patient with normal neuro exam, positive straight leg raise, regular rate and rhythm with no  other concerning findings on exam.    Initial diagnostic plan: No need for further work-up    Results from initial plan were reviewed and interpreted by me revealing see above    Diagnostic information from other sources: Discussed with mother at bedside    Interventions / Re-evaluation: Given Lidoderm patch, steroid injection in the emergency department    Results/clinical rationale were discussed with patient at bedside    Consultations/Discussion of results with other physicians: Discussed diagnosis of lumbosacral pain.  Given this is atraumatic, does not need any further imaging work-up.  Will provide symptomatic treatment and provide prescription for symptomatic treatment.  Provided referral to follow-up with a spine specialist.  Strict turn precaution for inability to ambulate, issues with defecation or urination.  I was called back to bedside just prior to discharge patient had been discharged but was refusing to leave.  States her pain was unrelieved, could not walk secondary to pain.  Remained neurologically intact, able to move feet, says that this is only secondary to pain.  Given fracture pain, will trial with Percocet.  Discussed with patient that would not prescribe Percocet as an outpatient giving poor clinical response in the outpatient setting.  Encouraged her to follow-up with Dr. Espinoza for further evaluation and she voiced understanding.    Disposition plan: Discharge  -----        Final diagnoses:   Lumbosacral strain, initial encounter          Darren Barahona MD  11/08/23 [id number]

## 2023-11-08 NOTE — Clinical Note
[facility] EMERGENCY DEPARTMENT  [address] [address]  Phone: [phone]    America Bonilla was seen and treated in our emergency department on 11/8/2023.  She may return to work on 11/10/2023.         Thank you for choosing [facility].    Darren Barahona MD

## 2023-11-08 NOTE — ED NOTES
"Attempted to help pt into wheelchair to be discharged and pt stated she \"is in too much pain to leave and wants to stay in bed until she feels better.\" Pt got back into bed and is not willing to get back up at this time. Charge RN notified  "

## 2023-11-08 NOTE — Clinical Note
[facility] EMERGENCY DEPARTMENT  [address] [address]  Phone: [phone]    America Bonilla was seen and treated in our emergency department on 11/8/2023.  She may return to work on 11/10/2023.         Thank you for choosing [facility].    Darren Barahona MD

## 2024-08-13 ENCOUNTER — TELEPHONE (OUTPATIENT)
Dept: INTERNAL MEDICINE | Facility: CLINIC | Age: 44
End: 2024-08-13
Payer: COMMERCIAL

## 2024-08-13 NOTE — TELEPHONE ENCOUNTER
"    Caller: America Bonilla \"[place]\"    Relationship to patient: Self    Best call back number: [phone]     Chief complaint: MEDICATION MANAGEMENT     Type of visit: NEW PATIENT WITH LIAN[facility]     Requested date: AS SOON AS POSSIBLE        Additional notes:PLEASE CALL PATIENT       "

## 2024-08-14 NOTE — TELEPHONE ENCOUNTER
Left VM advising that [facility] is not taking on new patients at this time. Advised that if patient would like to establish with another provider to contact the office.

## 2024-08-24 ENCOUNTER — DOCUMENTATION (OUTPATIENT)
Dept: INTERNAL MEDICINE | Facility: CLINIC | Age: 44
End: 2024-08-24
Payer: COMMERCIAL

## 2024-08-24 RX ORDER — FLUCONAZOLE 150 MG/1
TABLET ORAL
Qty: 3 TABLET | Refills: 1 | Status: SHIPPED | OUTPATIENT
Start: 2024-08-24

## 2024-09-06 ENCOUNTER — OFFICE VISIT (OUTPATIENT)
Dept: INTERNAL MEDICINE | Facility: CLINIC | Age: 44
End: 2024-09-06
Payer: COMMERCIAL

## 2024-09-06 VITALS
SYSTOLIC BLOOD PRESSURE: 124 MMHG | TEMPERATURE: 97.1 F | HEIGHT: 66 IN | OXYGEN SATURATION: 96 % | WEIGHT: 250 LBS | BODY MASS INDEX: 40.18 KG/M2 | DIASTOLIC BLOOD PRESSURE: 80 MMHG | HEART RATE: 62 BPM

## 2024-09-06 DIAGNOSIS — Z71.3 WEIGHT LOSS COUNSELING, ENCOUNTER FOR: ICD-10-CM

## 2024-09-06 DIAGNOSIS — F33.1 MAJOR DEPRESSIVE DISORDER, RECURRENT, MODERATE: Primary | ICD-10-CM

## 2024-09-06 DIAGNOSIS — M62.838 TRAPEZIUS MUSCLE SPASM: ICD-10-CM

## 2024-09-06 PROCEDURE — 99214 OFFICE O/P EST MOD 30 MIN: CPT | Performed by: STUDENT IN AN ORGANIZED HEALTH CARE EDUCATION/TRAINING PROGRAM

## 2024-09-06 RX ORDER — BUPROPION HYDROCHLORIDE 150 MG/1
150 TABLET ORAL DAILY
Qty: 90 TABLET | Refills: 0 | Status: SHIPPED | OUTPATIENT
Start: 2024-09-06

## 2024-09-06 RX ORDER — SEMAGLUTIDE 0.25 MG/.5ML
0.25 INJECTION, SOLUTION SUBCUTANEOUS WEEKLY
Qty: 2 ML | Refills: 3 | Status: SHIPPED | OUTPATIENT
Start: 2024-09-06

## 2024-09-06 RX ORDER — BUPROPION HCL 100 MG
TABLET,SUSTAINED-RELEASE 12 HR ORAL
COMMUNITY
End: 2024-09-06

## 2024-09-06 NOTE — PROGRESS NOTES
Subjective   America Bonilla is a 44 y.o. female.     History of Present Illness  Patient presents to reestablish care  Depression-patient is on Wellbutrin 100 once a day.  States that this does okay with her though she is not 100% controlled on it.  States it was originally started for her as a weight loss medication to dual treat her depression as well.  Denies suicidal thoughts or panic attacks.  Patient has been monitoring her diet trying to diet and exercise but has had no benefit for his weight loss goes.  She was taking Wellbutrin and naltrexone together for this but noticed no benefit so she stopped the naltrexone and The Wellbutrin for her depression symptoms only.  Patient has been having right shoulder pain.  States that she been having swelling in her trap muscle.  States that it comes and goes.  States that massage normally helps some.      The following portions of the patient's history were reviewed and updated as appropriate: allergies, current medications, past family history, past medical history, past social history, past surgical history, and problem list.    Review of Systems   All other systems reviewed and are negative.      Objective   Physical Exam  Vitals and nursing note reviewed.   Constitutional:       Appearance: Normal appearance.   HENT:      Head: Normocephalic and atraumatic.      Right Ear: External ear normal.      Left Ear: External ear normal.      Nose: Nose normal.      Mouth/Throat:      Mouth: Mucous membranes are moist.      Pharynx: Oropharynx is clear. No oropharyngeal exudate or posterior oropharyngeal erythema.   Eyes:      Extraocular Movements: Extraocular movements intact.      Conjunctiva/sclera: Conjunctivae normal.      Pupils: Pupils are equal, round, and reactive to light.   Cardiovascular:      Rate and Rhythm: Normal rate and regular rhythm.      Pulses: Normal pulses.      Heart sounds: Normal heart sounds.   Pulmonary:      Effort: Pulmonary effort is  normal.      Breath sounds: Normal breath sounds.   Abdominal:      General: Abdomen is flat. Bowel sounds are normal.      Palpations: Abdomen is soft.   Musculoskeletal:         General: Normal range of motion.      Cervical back: Normal range of motion.   Skin:     General: Skin is warm.      Capillary Refill: Capillary refill takes less than 2 seconds.   Neurological:      General: No focal deficit present.      Mental Status: She is alert and oriented to person, place, and time. Mental status is at baseline.   Psychiatric:         Mood and Affect: Mood normal.         Behavior: Behavior normal.         Thought Content: Thought content normal.         Judgment: Judgment normal.         Assessment & Plan   Diagnoses and all orders for this visit:    1. Major depressive disorder, recurrent, moderate (Primary)  -     buPROPion XL (Wellbutrin XL) 150 MG 24 hr tablet; Take 1 tablet by mouth Daily.  Dispense: 90 tablet; Refill: 0    2. Weight loss counseling, encounter for  -     Semaglutide-Weight Management (Wegovy) 0.25 MG/0.5ML solution auto-injector; Inject 0.5 mL under the skin into the appropriate area as directed 1 (One) Time Per Week.  Dispense: 2 mL; Refill: 3    3. Trapezius muscle spasm       Change Wellbutrin to Wellbutrin   Start Wegovy  Counseled to use Zanaflex and ibuprofen for muscle spasm.  Counseled on massaging muscle with a frozen water bottle and doing stretches for the trapezius muscle.

## 2024-11-01 ENCOUNTER — PRIOR AUTHORIZATION (OUTPATIENT)
Dept: INTERNAL MEDICINE | Facility: CLINIC | Age: 44
End: 2024-11-01
Payer: COMMERCIAL

## 2024-11-04 NOTE — TELEPHONE ENCOUNTER
America Bonilla (Sheikh: [email or web address])    form thumbnail  This request has received an Unfavorable outcome.    Request Reference Number: [id number]. WEGOVY INJ 0.25MG is denied due to Plan Exclusion. For further questions, call [phone].

## 2024-12-09 RX ORDER — FLUCONAZOLE 150 MG/1
TABLET ORAL
Qty: 3 TABLET | Refills: 1 | Status: CANCELLED | OUTPATIENT
Start: 2024-12-09

## 2024-12-10 RX ORDER — FLUCONAZOLE 150 MG/1
TABLET ORAL
Qty: 3 TABLET | Refills: 1 | Status: SHIPPED | OUTPATIENT
Start: 2024-12-10

## 2024-12-19 DIAGNOSIS — F33.1 MAJOR DEPRESSIVE DISORDER, RECURRENT, MODERATE: ICD-10-CM

## 2024-12-19 RX ORDER — BUPROPION HYDROCHLORIDE 150 MG/1
150 TABLET ORAL DAILY
Qty: 90 TABLET | Refills: 3 | Status: SHIPPED | OUTPATIENT
Start: 2024-12-19

## 2025-06-27 ENCOUNTER — OFFICE VISIT (OUTPATIENT)
Dept: INTERNAL MEDICINE | Facility: CLINIC | Age: 45
End: 2025-06-27
Payer: COMMERCIAL

## 2025-06-27 ENCOUNTER — PRIOR AUTHORIZATION (OUTPATIENT)
Dept: INTERNAL MEDICINE | Facility: CLINIC | Age: 45
End: 2025-06-27

## 2025-06-27 VITALS
HEIGHT: 66 IN | RESPIRATION RATE: 18 BRPM | HEART RATE: 62 BPM | BODY MASS INDEX: 41.3 KG/M2 | SYSTOLIC BLOOD PRESSURE: 122 MMHG | WEIGHT: 257 LBS | DIASTOLIC BLOOD PRESSURE: 80 MMHG | TEMPERATURE: 96.7 F | OXYGEN SATURATION: 96 %

## 2025-06-27 DIAGNOSIS — M54.2 CERVICAL MUSCLE PAIN: ICD-10-CM

## 2025-06-27 DIAGNOSIS — E66.811 CLASS 1 OBESITY DUE TO EXCESS CALORIES WITH SERIOUS COMORBIDITY AND BODY MASS INDEX (BMI) OF 32.0 TO 32.9 IN ADULT: ICD-10-CM

## 2025-06-27 DIAGNOSIS — E66.09 CLASS 1 OBESITY DUE TO EXCESS CALORIES WITH SERIOUS COMORBIDITY AND BODY MASS INDEX (BMI) OF 32.0 TO 32.9 IN ADULT: ICD-10-CM

## 2025-06-27 DIAGNOSIS — F33.1 MAJOR DEPRESSIVE DISORDER, RECURRENT, MODERATE: Primary | ICD-10-CM

## 2025-06-27 PROCEDURE — 99214 OFFICE O/P EST MOD 30 MIN: CPT | Performed by: STUDENT IN AN ORGANIZED HEALTH CARE EDUCATION/TRAINING PROGRAM

## 2025-06-27 RX ORDER — SEMAGLUTIDE 0.25 MG/.5ML
0.25 INJECTION, SOLUTION SUBCUTANEOUS WEEKLY
Qty: 6 ML | Refills: 0 | Status: SHIPPED | OUTPATIENT
Start: 2025-06-27

## 2025-06-27 RX ORDER — BUPROPION HYDROCHLORIDE 300 MG/1
300 TABLET ORAL DAILY
Qty: 90 TABLET | Refills: 1 | Status: SHIPPED | OUTPATIENT
Start: 2025-06-27

## 2025-06-27 RX ORDER — TIZANIDINE 2 MG/1
2 TABLET ORAL 2 TIMES DAILY PRN
Qty: 180 TABLET | Refills: 1 | Status: SHIPPED | OUTPATIENT
Start: 2025-06-27

## 2025-06-27 NOTE — TELEPHONE ENCOUNTER
PA has been denied. Wegovy is not covered by plan. Patient has been notified via [employer] message.

## 2025-06-27 NOTE — PROGRESS NOTES
Subjective   America Bonilla is a 45 y.o. female.     History of Present Illness  Presents with complaints  Interested in help losing weight. Stays active and tries to eat well but continues to gain weight.   Depression has been worsening over the past few months. Particularly the last 5 years have been hard and having a lot of external stressors on her. This has been difficult on her mental health. States wellbutrin was working well, but feels like it may need increased now. No si hi .      The following portions of the patient's history were reviewed and updated as appropriate: allergies, current medications, past family history, past medical history, past social history, past surgical history, and problem list.    Review of Systems   All other systems reviewed and are negative.      Objective   Physical Exam  Vitals and nursing note reviewed.   Constitutional:       Appearance: Normal appearance.   HENT:      Head: Normocephalic and atraumatic.      Right Ear: External ear normal.      Left Ear: External ear normal.      Nose: Nose normal.      Mouth/Throat:      Mouth: Mucous membranes are moist.      Pharynx: Oropharynx is clear. No oropharyngeal exudate or posterior oropharyngeal erythema.   Eyes:      Extraocular Movements: Extraocular movements intact.      Conjunctiva/sclera: Conjunctivae normal.      Pupils: Pupils are equal, round, and reactive to light.   Cardiovascular:      Rate and Rhythm: Regular rhythm.      Pulses: Normal pulses.      Heart sounds: Normal heart sounds.   Pulmonary:      Effort: Pulmonary effort is normal.   Abdominal:      General: Abdomen is flat. Bowel sounds are normal.      Palpations: Abdomen is soft.   Musculoskeletal:         General: Normal range of motion.      Cervical back: Normal range of motion.   Skin:     General: Skin is warm.      Capillary Refill: Capillary refill takes less than 2 seconds.   Neurological:      General: No focal deficit present.      Mental  Status: She is alert and oriented to person, place, and time. Mental status is at baseline.   Psychiatric:         Mood and Affect: Mood normal.         Behavior: Behavior normal.         Thought Content: Thought content normal.         Judgment: Judgment normal.         Assessment & Plan   Diagnoses and all orders for this visit:    1. Major depressive disorder, recurrent, moderate (Primary)  -     buPROPion XL (Wellbutrin XL) 300 MG 24 hr tablet; Take 1 tablet by mouth Daily.  Dispense: 90 tablet; Refill: 1    2. Class 1 obesity due to excess calories with serious comorbidity and body mass index (BMI) of 32.0 to 32.9 in adult  -     Semaglutide-Weight Management (Wegovy) 0.25 MG/0.5ML solution auto-injector; Inject 0.5 mL under the skin into the appropriate area as directed 1 (One) Time Per Week.  Dispense: 6 mL; Refill: 0    3. Cervical muscle pain  -     tiZANidine (ZANAFLEX) 2 MG tablet; Take 1 tablet by mouth 2 (Two) Times a Day As Needed for Muscle Spasms.  Dispense: 180 tablet; Refill: 1       Increase wellbutirn to 300 mg  Wegovy will try to pa. Gone over side effects, risk benefits